# Patient Record
Sex: FEMALE | Race: BLACK OR AFRICAN AMERICAN | Employment: FULL TIME | URBAN - METROPOLITAN AREA
[De-identification: names, ages, dates, MRNs, and addresses within clinical notes are randomized per-mention and may not be internally consistent; named-entity substitution may affect disease eponyms.]

---

## 2017-01-03 ENCOUNTER — ED HISTORICAL/CONVERTED ENCOUNTER (OUTPATIENT)
Dept: OTHER | Age: 47
End: 2017-01-03

## 2017-03-16 ENCOUNTER — ED HISTORICAL/CONVERTED ENCOUNTER (OUTPATIENT)
Dept: OTHER | Age: 47
End: 2017-03-16

## 2017-09-02 ENCOUNTER — ED HISTORICAL/CONVERTED ENCOUNTER (OUTPATIENT)
Dept: OTHER | Age: 47
End: 2017-09-02

## 2020-04-02 ENCOUNTER — ED HISTORICAL/CONVERTED ENCOUNTER (OUTPATIENT)
Dept: OTHER | Age: 50
End: 2020-04-02

## 2021-03-08 ENCOUNTER — APPOINTMENT (OUTPATIENT)
Dept: CT IMAGING | Age: 51
End: 2021-03-08
Attending: EMERGENCY MEDICINE
Payer: COMMERCIAL

## 2021-03-08 ENCOUNTER — APPOINTMENT (OUTPATIENT)
Dept: GENERAL RADIOLOGY | Age: 51
End: 2021-03-08
Attending: STUDENT IN AN ORGANIZED HEALTH CARE EDUCATION/TRAINING PROGRAM
Payer: COMMERCIAL

## 2021-03-08 ENCOUNTER — HOSPITAL ENCOUNTER (OUTPATIENT)
Age: 51
Setting detail: OBSERVATION
LOS: 1 days | Discharge: HOME | End: 2021-03-10
Attending: EMERGENCY MEDICINE | Admitting: FAMILY MEDICINE
Payer: COMMERCIAL

## 2021-03-08 DIAGNOSIS — R07.9 ACUTE CHEST PAIN: Primary | ICD-10-CM

## 2021-03-08 LAB
BASOPHILS # BLD: 0 K/UL (ref 0–0.1)
BASOPHILS NFR BLD: 1 % (ref 0–1)
DIFFERENTIAL METHOD BLD: ABNORMAL
EOSINOPHIL # BLD: 0.1 K/UL (ref 0–0.4)
EOSINOPHIL NFR BLD: 2 % (ref 0–7)
ERYTHROCYTE [DISTWIDTH] IN BLOOD BY AUTOMATED COUNT: 12.9 % (ref 11.5–14.5)
HCT VFR BLD AUTO: 42.5 % (ref 35–47)
HGB BLD-MCNC: 13.7 G/DL (ref 11.5–16)
IMM GRANULOCYTES # BLD AUTO: 0 K/UL (ref 0–0.04)
IMM GRANULOCYTES NFR BLD AUTO: 0 % (ref 0–0.5)
LYMPHOCYTES # BLD: 4.8 K/UL (ref 0.8–3.5)
LYMPHOCYTES NFR BLD: 60 % (ref 12–49)
MCH RBC QN AUTO: 30.5 PG (ref 26–34)
MCHC RBC AUTO-ENTMCNC: 32.2 G/DL (ref 30–36.5)
MCV RBC AUTO: 94.7 FL (ref 80–99)
MONOCYTES # BLD: 0.6 K/UL (ref 0–1)
MONOCYTES NFR BLD: 7 % (ref 5–13)
NEUTS SEG # BLD: 2.4 K/UL (ref 1.8–8)
NEUTS SEG NFR BLD: 30 % (ref 32–75)
PLATELET # BLD AUTO: 288 K/UL (ref 150–400)
PMV BLD AUTO: 10 FL (ref 8.9–12.9)
RBC # BLD AUTO: 4.49 M/UL (ref 3.8–5.2)
WBC # BLD AUTO: 7.9 K/UL (ref 3.6–11)

## 2021-03-08 PROCEDURE — 83690 ASSAY OF LIPASE: CPT

## 2021-03-08 PROCEDURE — 36415 COLL VENOUS BLD VENIPUNCTURE: CPT

## 2021-03-08 PROCEDURE — 80053 COMPREHEN METABOLIC PANEL: CPT

## 2021-03-08 PROCEDURE — 83880 ASSAY OF NATRIURETIC PEPTIDE: CPT

## 2021-03-08 PROCEDURE — 85025 COMPLETE CBC W/AUTO DIFF WBC: CPT

## 2021-03-08 PROCEDURE — 84484 ASSAY OF TROPONIN QUANT: CPT

## 2021-03-08 PROCEDURE — 93005 ELECTROCARDIOGRAM TRACING: CPT

## 2021-03-08 PROCEDURE — 99284 EMERGENCY DEPT VISIT MOD MDM: CPT

## 2021-03-08 PROCEDURE — 71045 X-RAY EXAM CHEST 1 VIEW: CPT

## 2021-03-08 PROCEDURE — 83735 ASSAY OF MAGNESIUM: CPT

## 2021-03-08 NOTE — Clinical Note
Status[de-identified] INPATIENT [101]   Type of Bed: Telemetry [19]   Inpatient Hospitalization Certified Necessary for the Following Reasons: 9.  Other (further clarification in H&P documentation)   Admitting Diagnosis: Chest pain [[id number]]   Admitting Physician: Ania Kunz [[id number]]   Attending Physician: Ania Kunz [[id number]]   Estimated Length of Stay: 2 Midnights   Discharge Plan[de-identified] Home with Office Follow-up

## 2021-03-09 ENCOUNTER — APPOINTMENT (OUTPATIENT)
Dept: NON INVASIVE DIAGNOSTICS | Age: 51
End: 2021-03-09
Attending: FAMILY MEDICINE
Payer: COMMERCIAL

## 2021-03-09 ENCOUNTER — APPOINTMENT (OUTPATIENT)
Dept: CT IMAGING | Age: 51
End: 2021-03-09
Attending: EMERGENCY MEDICINE
Payer: COMMERCIAL

## 2021-03-09 PROBLEM — R07.9 CHEST PAIN: Status: ACTIVE | Noted: 2021-03-09

## 2021-03-09 LAB
ALBUMIN SERPL-MCNC: 4 G/DL (ref 3.5–5)
ALBUMIN/GLOB SERPL: 1 {RATIO} (ref 1.1–2.2)
ALP SERPL-CCNC: 78 U/L (ref 45–117)
ALT SERPL-CCNC: 40 U/L (ref 12–78)
ANION GAP SERPL CALC-SCNC: 5 MMOL/L (ref 5–15)
AST SERPL W P-5'-P-CCNC: 49 U/L (ref 15–37)
ATRIAL RATE: 57 BPM
ATRIAL RATE: 68 BPM
BILIRUB SERPL-MCNC: 0.3 MG/DL (ref 0.2–1)
BNP SERPL-MCNC: 35 PG/ML
BUN SERPL-MCNC: 15 MG/DL (ref 6–20)
BUN/CREAT SERPL: 13 (ref 12–20)
CA-I BLD-MCNC: 9 MG/DL (ref 8.5–10.1)
CALCULATED P AXIS, ECG09: 39 DEGREES
CALCULATED P AXIS, ECG09: 55 DEGREES
CALCULATED R AXIS, ECG10: 32 DEGREES
CALCULATED R AXIS, ECG10: 49 DEGREES
CALCULATED T AXIS, ECG11: 54 DEGREES
CALCULATED T AXIS, ECG11: 55 DEGREES
CHLORIDE SERPL-SCNC: 106 MMOL/L (ref 97–108)
CO2 SERPL-SCNC: 28 MMOL/L (ref 21–32)
CREAT SERPL-MCNC: 1.15 MG/DL (ref 0.55–1.02)
DIAGNOSIS, 93000: NORMAL
DIAGNOSIS, 93000: NORMAL
ECHO AV AREA PEAK VELOCITY: 2.42 CM2
ECHO AV AREA/BSA PEAK VELOCITY: 1.3 CM2/M2
ECHO AV PEAK GRADIENT: 5 MMHG
ECHO EST RA PRESSURE: 3 MMHG
ECHO LA AREA 4C: 8.46 CM2
ECHO LV E' SEPTAL VELOCITY: 10.3 CM/S
ECHO LV EDV A2C: 95.4 CM3
ECHO LV EJECTION FRACTION A2C: 63 %
ECHO LV EJECTION FRACTION BIPLANE: 63.5 % (ref 55–100)
ECHO LV ESV A2C: 23.4 CM3
ECHO LV INTERNAL DIMENSION DIASTOLIC: 4.57 CM (ref 3.9–5.3)
ECHO LV INTERNAL DIMENSION SYSTOLIC: 2.86 CM
ECHO LV IVSD: 1.12 CM (ref 0.6–0.9)
ECHO LV MASS 2D: 146.5 G (ref 67–162)
ECHO LV MASS INDEX 2D: 78 G/M2 (ref 43–95)
ECHO LV POSTERIOR WALL DIASTOLIC: 0.78 CM (ref 0.6–0.9)
ECHO LVOT DIAM: 1.8 CM
ECHO LVOT PEAK GRADIENT: 4 MMHG
ECHO MV A VELOCITY: 54.3 CM/S
ECHO MV AREA PHT: 1.83 CM2
ECHO MV E DECELERATION TIME (DT): 201 MS
ECHO MV E VELOCITY: 68.5 CM/S
ECHO MV E/A RATIO: 1.26
ECHO MV E/E' SEPTAL: 6.65
ECHO MV PRESSURE HALF TIME (PHT): 120 MS
ECHO PV PEAK INSTANTANEOUS GRADIENT SYSTOLIC: 3 MMHG
ECHO PV PEAK INSTANTANEOUS GRADIENT SYSTOLIC: 5 MMHG
ECHO PV REGURGITANT MAX VELOCITY: 104 CM/S
ECHO PV REGURGITANT MAX VELOCITY: 107 CM/S
ECHO PV REGURGITANT MAX VELOCITY: 109 CM/S
ECHO PV REGURGITANT MAX VELOCITY: 87.5 CM/S
ECHO PVEIN A DURATION: 85 MS
ECHO PVEIN A VELOCITY: 28.5 CM/S
ECHO RA AREA 4C: 10.7 CM2
ECHO RIGHT VENTRICULAR SYSTOLIC PRESSURE (RVSP): 23 MMHG
ECHO RV INTERNAL DIMENSION: 3 CM
ECHO TV MAX VELOCITY: 222 CM/S
ECHO TV REGURGITANT PEAK GRADIENT: 20 MMHG
GLOBULIN SER CALC-MCNC: 3.9 G/DL (ref 2–4)
GLUCOSE SERPL-MCNC: 99 MG/DL (ref 65–100)
LIPASE SERPL-CCNC: 132 U/L (ref 73–393)
MAGNESIUM SERPL-MCNC: 2 MG/DL (ref 1.6–2.4)
MV DEC SLOPE: 2290 MM/S2
MV DEC SLOPE: 2290 MM/S2
P-R INTERVAL, ECG05: 166 MS
P-R INTERVAL, ECG05: 168 MS
POTASSIUM SERPL-SCNC: 4.4 MMOL/L (ref 3.5–5.1)
PROT SERPL-MCNC: 7.9 G/DL (ref 6.4–8.2)
Q-T INTERVAL, ECG07: 394 MS
Q-T INTERVAL, ECG07: 430 MS
QRS DURATION, ECG06: 72 MS
QRS DURATION, ECG06: 82 MS
QTC CALCULATION (BEZET), ECG08: 418 MS
QTC CALCULATION (BEZET), ECG08: 418 MS
SODIUM SERPL-SCNC: 139 MMOL/L (ref 136–145)
TROPONIN I SERPL-MCNC: <0.05 NG/ML
TSH SERPL DL<=0.05 MIU/L-ACNC: 0.5 UIU/ML (ref 0.36–3.74)
VENTRICULAR RATE, ECG03: 57 BPM
VENTRICULAR RATE, ECG03: 68 BPM

## 2021-03-09 PROCEDURE — 71275 CT ANGIOGRAPHY CHEST: CPT

## 2021-03-09 PROCEDURE — 84484 ASSAY OF TROPONIN QUANT: CPT

## 2021-03-09 PROCEDURE — 36415 COLL VENOUS BLD VENIPUNCTURE: CPT

## 2021-03-09 PROCEDURE — 74011250637 HC RX REV CODE- 250/637: Performed by: EMERGENCY MEDICINE

## 2021-03-09 PROCEDURE — 74011250637 HC RX REV CODE- 250/637: Performed by: FAMILY MEDICINE

## 2021-03-09 PROCEDURE — 74011000636 HC RX REV CODE- 636: Performed by: EMERGENCY MEDICINE

## 2021-03-09 PROCEDURE — 99218 HC RM OBSERVATION: CPT

## 2021-03-09 PROCEDURE — 84443 ASSAY THYROID STIM HORMONE: CPT

## 2021-03-09 PROCEDURE — 74011250636 HC RX REV CODE- 250/636: Performed by: FAMILY MEDICINE

## 2021-03-09 PROCEDURE — 93005 ELECTROCARDIOGRAM TRACING: CPT

## 2021-03-09 PROCEDURE — 93306 TTE W/DOPPLER COMPLETE: CPT

## 2021-03-09 PROCEDURE — 96372 THER/PROPH/DIAG INJ SC/IM: CPT

## 2021-03-09 RX ORDER — SODIUM CHLORIDE 9 MG/ML
75 INJECTION, SOLUTION INTRAVENOUS CONTINUOUS
Status: DISCONTINUED | OUTPATIENT
Start: 2021-03-09 | End: 2021-03-10 | Stop reason: HOSPADM

## 2021-03-09 RX ORDER — ASPIRIN 325 MG
325 TABLET ORAL DAILY
Status: DISCONTINUED | OUTPATIENT
Start: 2021-03-09 | End: 2021-03-10 | Stop reason: HOSPADM

## 2021-03-09 RX ORDER — GUAIFENESIN 100 MG/5ML
324 LIQUID (ML) ORAL
Status: COMPLETED | OUTPATIENT
Start: 2021-03-09 | End: 2021-03-09

## 2021-03-09 RX ORDER — POLYETHYLENE GLYCOL 3350 17 G/17G
17 POWDER, FOR SOLUTION ORAL DAILY PRN
Status: DISCONTINUED | OUTPATIENT
Start: 2021-03-09 | End: 2021-03-10 | Stop reason: HOSPADM

## 2021-03-09 RX ORDER — ONDANSETRON 2 MG/ML
4 INJECTION INTRAMUSCULAR; INTRAVENOUS
Status: DISCONTINUED | OUTPATIENT
Start: 2021-03-09 | End: 2021-03-10 | Stop reason: HOSPADM

## 2021-03-09 RX ORDER — ACETAMINOPHEN 650 MG/1
650 SUPPOSITORY RECTAL
Status: DISCONTINUED | OUTPATIENT
Start: 2021-03-09 | End: 2021-03-10 | Stop reason: HOSPADM

## 2021-03-09 RX ORDER — ENOXAPARIN SODIUM 100 MG/ML
40 INJECTION SUBCUTANEOUS DAILY
Status: DISCONTINUED | OUTPATIENT
Start: 2021-03-09 | End: 2021-03-10 | Stop reason: HOSPADM

## 2021-03-09 RX ORDER — ATORVASTATIN CALCIUM 40 MG/1
40 TABLET, FILM COATED ORAL
Status: DISCONTINUED | OUTPATIENT
Start: 2021-03-09 | End: 2021-03-10 | Stop reason: HOSPADM

## 2021-03-09 RX ORDER — ONDANSETRON 4 MG/1
4 TABLET, ORALLY DISINTEGRATING ORAL
Status: DISCONTINUED | OUTPATIENT
Start: 2021-03-09 | End: 2021-03-10 | Stop reason: HOSPADM

## 2021-03-09 RX ORDER — ACETAMINOPHEN 325 MG/1
650 TABLET ORAL
Status: DISCONTINUED | OUTPATIENT
Start: 2021-03-09 | End: 2021-03-10 | Stop reason: HOSPADM

## 2021-03-09 RX ADMIN — ASPIRIN 325 MG ORAL TABLET 325 MG: 325 PILL ORAL at 12:32

## 2021-03-09 RX ADMIN — IOPAMIDOL 100 ML: 755 INJECTION, SOLUTION INTRAVENOUS at 00:40

## 2021-03-09 RX ADMIN — ENOXAPARIN SODIUM 40 MG: 40 INJECTION SUBCUTANEOUS at 12:32

## 2021-03-09 RX ADMIN — ASPIRIN 324 MG: 81 TABLET, CHEWABLE ORAL at 01:52

## 2021-03-09 NOTE — ED PROVIDER NOTES
EMERGENCY DEPARTMENT HISTORY AND PHYSICAL EXAM      Date: 3/8/2021  Patient Name: Paola Donald      History of Presenting Illness     Chief Complaint   Patient presents with    Chest Pain       History Provided By: Patient    HPI: Paola Donald, 45 y.o. female with a past medical history significant  presents to the ED with cc of sob still not chest pain chest pressure with mild shortness of breath that happened 2 or 3 times this afternoon. Patient does have history of chest pain in the past.  Last stress test was 8 years ago at [place]. Patient denies any nausea vomiting, diaphoresis. Patient admits to be infected with Covid back in January about 3 months ago. Chest pressure is 3/10 at this time. Does not radiate. No other complaints. Patient denies a smoking. Patient is a social drinker. There are no other complaints, changes, or physical findings at this time. PCP: Matthew, MD [place]        Past History     Past Medical History:  Past Medical History:   Diagnosis Date    Anxiety        Past Surgical History:  Past Surgical History:   Procedure Laterality Date    HX APPENDECTOMY      HX TUBAL LIGATION         Family History:  No family history on file. Social History:  Social History     Tobacco Use    Smoking status: Never Smoker    Smokeless tobacco: Never Used   Substance Use Topics    Alcohol use: Yes     Comment: occasional    Drug use: Not Currently       Allergies: Allergies   Allergen Reactions    Paxil [Paroxetine Hcl] Unable to Obtain         Review of Systems     Review of Systems   Constitutional: Negative. HENT: Negative. Eyes: Negative. Respiratory: Negative. Cardiovascular: Positive for chest pain. Gastrointestinal: Negative. Genitourinary: Negative. Musculoskeletal: Negative. Skin: Negative. Neurological: Negative. Psychiatric/Behavioral: Negative. All other systems reviewed and are negative.       Physical Exam     Physical Exam  Vitals signs and nursing note reviewed. Constitutional:       General: She is not in acute distress. Appearance: Normal appearance. She is well-developed and normal weight. She is not ill-appearing, toxic-appearing or diaphoretic. HENT:      Head: Normocephalic. Right Ear: External ear normal.      Left Ear: External ear normal.      Nose: Nose normal. No congestion or rhinorrhea. Mouth/Throat:      Pharynx: Oropharynx is clear. No oropharyngeal exudate or posterior oropharyngeal erythema. Eyes:      General: No scleral icterus. Right eye: No discharge. Left eye: No discharge. Extraocular Movements: Extraocular movements intact. Conjunctiva/sclera: Conjunctivae normal.      Pupils: Pupils are equal, round, and reactive to light. Neck:      Musculoskeletal: Normal range of motion and neck supple. No neck rigidity or muscular tenderness. Cardiovascular:      Rate and Rhythm: Normal rate and regular rhythm. Pulses: Normal pulses. Heart sounds: Normal heart sounds. No murmur. Pulmonary:      Effort: Pulmonary effort is normal. No respiratory distress. Breath sounds: Normal breath sounds. No stridor. No wheezing, rhonchi or rales. Chest:      Chest wall: No deformity or tenderness. Abdominal:      General: Abdomen is flat. Bowel sounds are normal. There is no distension. Palpations: Abdomen is soft. Tenderness: There is no abdominal tenderness. There is no right CVA tenderness, left CVA tenderness, guarding or rebound. Musculoskeletal: Normal range of motion. General: No swelling, tenderness, deformity or signs of injury. Right lower leg: No edema. Left lower leg: No edema. Skin:     General: Skin is warm. Capillary Refill: Capillary refill takes less than 2 seconds. Coloration: Skin is not jaundiced or pale. Findings: No bruising, erythema, lesion or rash.    Neurological:      General: No focal deficit present. Mental Status: She is alert and oriented to person, place, and time. Mental status is at baseline. Cranial Nerves: No cranial nerve deficit. Sensory: No sensory deficit. Motor: No weakness. Coordination: Coordination normal.   Psychiatric:         Mood and Affect: Mood normal.         Behavior: Behavior normal.         Thought Content: Thought content normal.         Judgment: Judgment normal.         Lab and Diagnostic Study Results     Labs -     Recent Results (from the past 12 hour(s))   CBC WITH AUTOMATED DIFF    Collection Time: 03/08/21 10:55 PM   Result Value Ref Range    WBC 7.9 3.6 - 11.0 K/uL    RBC 4.49 3.80 - 5.20 M/uL    HGB 13.7 11.5 - 16.0 g/dL    HCT 42.5 35.0 - 47.0 %    MCV 94.7 80.0 - 99.0 FL    MCH 30.5 26.0 - 34.0 PG    MCHC 32.2 30.0 - 36.5 g/dL    RDW 12.9 11.5 - 14.5 %    PLATELET 645 254 - 456 K/uL    MPV 10.0 8.9 - 12.9 FL    NEUTROPHILS 30 (L) 32 - 75 %    LYMPHOCYTES 60 (H) 12 - 49 %    MONOCYTES 7 5 - 13 %    EOSINOPHILS 2 0 - 7 %    BASOPHILS 1 0 - 1 %    IMMATURE GRANULOCYTES 0 0.0 - 0.5 %    ABS. NEUTROPHILS 2.4 1.8 - 8.0 K/UL    ABS. LYMPHOCYTES 4.8 (H) 0.8 - 3.5 K/UL    ABS. MONOCYTES 0.6 0.0 - 1.0 K/UL    ABS. EOSINOPHILS 0.1 0.0 - 0.4 K/UL    ABS. BASOPHILS 0.0 0.0 - 0.1 K/UL    ABS. IMM. GRANS. 0.0 0.00 - 0.04 K/UL    DF AUTOMATED     METABOLIC PANEL, COMPREHENSIVE    Collection Time: 03/08/21 10:55 PM   Result Value Ref Range    Sodium 139 136 - 145 mmol/L    Potassium 4.4 3.5 - 5.1 mmol/L    Chloride 106 97 - 108 mmol/L    CO2 28 21 - 32 mmol/L    Anion gap 5 5 - 15 mmol/L    Glucose 99 65 - 100 mg/dL    BUN 15 6 - 20 mg/dL    Creatinine 1.15 (H) 0.55 - 1.02 mg/dL    BUN/Creatinine ratio 13 12 - 20      GFR est AA >60 >60 ml/min/1.73m2    GFR est non-AA 50 (L) >60 ml/min/1.73m2    Calcium 9.0 8.5 - 10.1 mg/dL    Bilirubin, total 0.3 0.2 - 1.0 mg/dL    AST (SGOT) 49 (H) 15 - 37 U/L    ALT (SGPT) 40 12 - 78 U/L    Alk.  phosphatase 78 45 - 117 U/L    Protein, total 7.9 6.4 - 8.2 g/dL    Albumin 4.0 3.5 - 5.0 g/dL    Globulin 3.9 2.0 - 4.0 g/dL    A-G Ratio 1.0 (L) 1.1 - 2.2     TROPONIN I    Collection Time: 03/08/21 10:55 PM   Result Value Ref Range    Troponin-I, Qt. <0.05 <0.05 ng/mL   BNP    Collection Time: 03/08/21 10:55 PM   Result Value Ref Range    NT pro-BNP 35 <125 pg/mL   LIPASE    Collection Time: 03/08/21 10:55 PM   Result Value Ref Range    Lipase 132 73 - 393 U/L   MAGNESIUM    Collection Time: 03/08/21 10:55 PM   Result Value Ref Range    Magnesium 2.0 1.6 - 2.4 mg/dL       Radiologic Studies -   [unfilled]  CT Results  (Last 48 hours)               03/09/21 0039  CTA CHEST W OR W WO CONT Final result    Impression:      No pulmonary emboli. Mild right axillary adenopathy. Questionable small density in the right breast.   Recommend a follow-up mammogram for further evaluation, if clinically indicated. The results were called and verbally communicated to Dr. Abiel Ponce on 3/9/2021 at   1:15 AM.       Narrative:  CTA chest with intravenous contrast, 100 cc Isovue 370, 3-D MIP images       Dose reduction: All CT scans at this facility are performed using dose reduction   optimization techniques as appropriate to a performed exam including the   following: Automated exposure control, adjustments of the mA and/or kV according   to patient size, or use of iterative reconstruction technique. INDICATION: Chest pain, shortness of breath       FINDINGS:       No pulmonary emboli are identified. No aneurysm or dissection of thoracic aorta. Approximately 7 mm density in the right breast may be part of fibroglandular   tissue, although a lesion would be difficult to exclude. Mammography is more   sensitive for evaluation of the breasts. Slightly enlarged right axillary nodes,   largest about 16 x 10 mm. No mediastinal adenopathy. No pleural or pericardial   effusions. No airspace disease in the lungs.  Minimal atelectasis/scar lung bases. No pneumothorax. No acute fracture in the visualized bony structures. CXR Results  (Last 48 hours)               03/08/21 2300  XR CHEST PORT Final result    Impression:  No acute cardiopulmonary process. Narrative:  XR CHEST PORT       Comparison: Chest radiograph dated April 2, 2020       Findings: The lungs are adequately inflated without focal consolidation. Cardiac   silhouette is within normal limits for size, no evidence of cardiac   decompensation. The osseous structures are intact. Medical Decision Making and ED Course   - I am the first and primary provider for this patient AND AM THE PRIMARY PROVIDER OF RECORD. - I reviewed the vital signs, available nursing notes, past medical history, past surgical history, family history and social history. - Initial assessment performed. The patients presenting problems have been discussed, and the staff are in agreement with the care plan formulated and outlined with them. I have encouraged them to ask questions as they arise throughout their visit. Vital Signs-Reviewed the patient's vital signs. Patient Vitals for the past 12 hrs:   Temp Pulse Resp BP SpO2   03/08/21 2235 98.1 °F (36.7 °C) 86 18 (!) 141/85 100 %       EKG interpretation: (Preliminary): EKG was done at 10:44 PM.  Normal sinus rhythm. Rate of 68. Normal axis. No acute ST elevation. No STEMI. No old EKG available for comparison at this time. Records Reviewed: Nursing Notes    The patient presents with chest pain    ED Course:              Provider Notes (Medical Decision Making):     MDM  Number of Diagnoses or Management Options  Acute chest pain: new, needed workup  Diagnosis management comments: Patient diagnoses include chest pain, chest wall pain, pneumothorax, pneumonia, bronchitis, pulmonary fibrosis, PE, pancreatitis, gallbladder disease, coronary artery disease.        Amount and/or Complexity of Data Reviewed  Clinical lab tests: ordered and reviewed  Tests in the radiology section of CPT®: ordered  Tests in the medicine section of CPT®: reviewed and ordered  Discuss the patient with other providers: yes (Case discussed with admitting physician . Admit to his service.)  Independent visualization of images, tracings, or specimens: yes (EKG was done at 10:44 PM.  Normal sinus rhythm. Rate of 68. Normal axis. No acute ST elevation. No STEMI. No old EKG available for comparison at this time.    )    Risk of Complications, Morbidity, and/or Mortality  Presenting problems: high  Diagnostic procedures: high  Management options: high  General comments: Patient remained stable throughout her course of treatment. Labs were unremarkable. Troponin was negative. EKG did not show any acute changes. According to the patient and her father had heart attack at the age of 76s. The decision was made to admit patients. Case discussed with admitting doctor. Patient does not remember the name of her physician however she has stated her physician works at [place] and does not come to [facility]. Case also discussed with patient regarding admissions. She understood and agree with her management. Aspirin was given. Consultations:       Consultations:         Procedures and Critical Care               CRITICAL CARE NOTE :  10:50 PM  Amount of Critical Care Time: (minutes)    IMPENDING DETERIORATION -  ASSOCIATED RISK FACTORS -   MANAGEMENT-   INTERPRETATION -    INTERVENTIONS -   CASE REVIEW -   TREATMENT RESPONSE -  PERFORMED BY -     NOTES   :  I have spent critical care time involved in lab review, consultations with specialist, family decision- making, bedside attention and documentation. This time excludes time spent in any separate billed procedures. During this entire length of time I was immediately available to the patient .     David Shrestha, DO        Disposition     Disposition: Condition stable    Admitted        Diagnosis     Clinical Impression:   1. Acute chest pain        Attestations:    Madhuri Daily, DO    Please note that this dictation was completed with [employer], the computer voice recognition software. Quite often unanticipated grammatical, syntax, homophones, and other interpretive errors are inadvertently transcribed by the computer software. Please disregard these errors. Please excuse any errors that have escaped final proofreading. Thank you.

## 2021-03-09 NOTE — MED STUDENT NOTES
*ATTENTION:  This note has been created by a medical student for educational purposes only. Please do not refer to the content of this note for clinical decision-making, billing, or other purposes. Please see attending physicians note to obtain clinical information on this patient. * History and Physical 
 
NAME: Lisbeth Nieves :  1970 MRN:  [id number] Date/Time:  3/9/2021 8:51 AM 
 
Patient PCP: Watson Moreno MD 
______________________________________________________________________ Subjective: CHIEF COMPLAINT: chest pressure and shortness of breath HISTORY OF PRESENT ILLNESS:    
 
Patient is a 45y.o. year old female with past history of anxiety presented to the ED on 3/8 for chest pressure and shortness of breath. She states that the chest pressure had been intermittent throughout the weekend, but more pronounced at night. She states that she would wake up feeling the pressure and felt like she could not catch her breath. She denies any history of having this pain before, any history of heart problems, or sleep apnea. She also denies nausea, vomiting, and diaphoresis during these events. During this exam she states that she is not having any chest pressure or shortness of breath. She did not have any chest pressure or shortness of breath overnight either. Troponin < 0.05 BNP 35 CXR negative CTA chest showed No pulmonary emboli. Mild right axillary adenopathy. Questionable small density in the right breast. 
Recommend a follow-up mammogram for further evaluation, if clinically indicated. Past Medical History:  
Diagnosis Date  Anxiety Past Surgical History:  
Procedure Laterality Date  HX APPENDECTOMY  HX TUBAL LIGATION Social History Tobacco Use  Smoking status: Never Smoker  Smokeless tobacco: Never Used Substance Use Topics  Alcohol use: Yes Comment: occasional  
  
 
Family History Family history unknown: Yes Allergies Allergen Reactions  Paxil [Paroxetine Hcl] Unable to [facility] [place] Prior to Admission medications Not on File No current facility-administered medications for this encounter. LAB DATA REVIEWED:   
Recent Results (from the past 24 hour(s)) CBC WITH AUTOMATED DIFF Collection Time: 03/08/21 10:55 PM  
Result Value Ref Range WBC 7.9 3.6 - 11.0 K/uL  
 RBC 4.49 3.80 - 5.20 M/uL  
 HGB 13.7 11.5 - 16.0 g/dL HCT 42.5 35.0 - 47.0 % MCV 94.7 80.0 - 99.0 FL  
 MCH 30.5 26.0 - 34.0 PG  
 MCHC 32.2 30.0 - 36.5 g/dL  
 RDW 12.9 11.5 - 14.5 % PLATELET 917 603 - 777 K/uL MPV 10.0 8.9 - 12.9 FL  
 NEUTROPHILS 30 (L) 32 - 75 % LYMPHOCYTES 60 (H) 12 - 49 % MONOCYTES 7 5 - 13 % EOSINOPHILS 2 0 - 7 % BASOPHILS 1 0 - 1 % IMMATURE GRANULOCYTES 0 0.0 - 0.5 % ABS. NEUTROPHILS 2.4 1.8 - 8.0 K/UL  
 ABS. LYMPHOCYTES 4.8 (H) 0.8 - 3.5 K/UL  
 ABS. MONOCYTES 0.6 0.0 - 1.0 K/UL  
 ABS. EOSINOPHILS 0.1 0.0 - 0.4 K/UL  
 ABS. BASOPHILS 0.0 0.0 - 0.1 K/UL  
 ABS. IMM. GRANS. 0.0 0.00 - 0.04 K/UL  
 DF AUTOMATED METABOLIC PANEL, COMPREHENSIVE Collection Time: 03/08/21 10:55 PM  
Result Value Ref Range Sodium 139 136 - 145 mmol/L Potassium 4.4 3.5 - 5.1 mmol/L Chloride 106 97 - 108 mmol/L  
 CO2 28 21 - 32 mmol/L Anion gap 5 5 - 15 mmol/L Glucose 99 65 - 100 mg/dL BUN 15 6 - 20 mg/dL Creatinine 1.15 (H) 0.55 - 1.02 mg/dL BUN/Creatinine ratio 13 12 - 20 GFR est AA >60 >60 ml/min/1.73m2 GFR est non-AA 50 (L) >60 ml/min/1.73m2 Calcium 9.0 8.5 - 10.1 mg/dL Bilirubin, total 0.3 0.2 - 1.0 mg/dL AST (SGOT) 49 (H) 15 - 37 U/L  
 ALT (SGPT) 40 12 - 78 U/L Alk. phosphatase 78 45 - 117 U/L Protein, total 7.9 6.4 - 8.2 g/dL Albumin 4.0 3.5 - 5.0 g/dL Globulin 3.9 2.0 - 4.0 g/dL A-G Ratio 1.0 (L) 1.1 - 2.2    
TROPONIN I Collection Time: 03/08/21 10:55 PM  
Result Value Ref Range  Troponin-I, Qt. <0.05 <0.05 ng/mL BNP Collection Time: 03/08/21 10:55 PM  
Result Value Ref Range NT pro-BNP 35 <125 pg/mL LIPASE Collection Time: 03/08/21 10:55 PM  
Result Value Ref Range Lipase 132 73 - 393 U/L MAGNESIUM Collection Time: 03/08/21 10:55 PM  
Result Value Ref Range Magnesium 2.0 1.6 - 2.4 mg/dL XR Results (most recent): 
Results from [facility] Encounter encounter on 03/08/21 XR CHEST PORT Narrative XR CHEST PORT Comparison: Chest radiograph dated April 2, 2020 Findings: The lungs are adequately inflated without focal consolidation. Cardiac 
silhouette is within normal limits for size, no evidence of cardiac 
decompensation. The osseous structures are intact. Impression No acute cardiopulmonary process. CTA CHEST W OR W WO CONT Final Result No pulmonary emboli. Mild right axillary adenopathy. Questionable small density in the right breast.  
Recommend a follow-up mammogram for further evaluation, if clinically indicated. The results were called and verbally communicated to Dr. Nakul Harrell on 3/9/2021 at  
1:15 AM. XR CHEST PORT Final Result No acute cardiopulmonary process. Review of Systems: 
Constitutional: Negative for chills and fever. HENT: Negative. Eyes: Negative. Respiratory: Negative. Cardiovascular: Negative. Gastrointestinal: Negative for abdominal pain and nausea. Skin: Negative. Neurological: Negative. Objective: VITALS:   
Visit Vitals /65 (BP 1 Location: Left upper arm, BP Patient Position: At rest) Pulse 62 Temp 98.6 °F (37 °C) Resp 20 Ht 5' 5\" (1.651 m) Wt 80.4 kg (177 lb 4 oz) SpO2 98% Breastfeeding No  
BMI 29.50 kg/m² Physical Exam:  
Constitutional: pt is oriented to person, place, and time. HENT:  
Head: Normocephalic and atraumatic. Eyes: Pupils are equal, round, and reactive to light.  EOM are normal.  
Cardiovascular: Normal rate, regular rhythm and normal heart sounds. Pulmonary/Chest: Breath sounds normal. No wheezes. No rales. Exhibits no tenderness. Abdominal: Soft. Bowel sounds are normal. There is no abdominal tenderness. There is no rebound and no guarding. Musculoskeletal: Normal range of motion. Neurological: pt is alert and oriented to person, place, and time. Alert. Normal strength. No cranial nerve deficit or sensory deficit. Displays a negative Romberg sign. ASSESSMENT: 
Chest pain Anxiety PLAN: 
 
Continue cardiac monitoring Consult cardiology  
________________________________________________________________________ Signed: Aixa Treadwell

## 2021-03-09 NOTE — CONSULTS
[facility] CARDIOLOGY  CARDIOLOGY CONSULTATION    REASON FOR CONSULT: Chest pain    REQUESTING PROVIDER: Dr. Sonja Meraz:  Chest pain    HISTORY OF PRESENT ILLNESS:  Lucian Dejesus is a 45y.o. year-old female with no significant past medical history  who was evaluated today due to chest pain. She presented to the ED last night with worsening chest pressure. The chest pressure started Saturday   3/6 and was off and on. She reports no specific aggravating or alleviating factors. The pressure would be relieved on its on. It would occur at rest and with walking. Last night there was associated shortness of breath and jaw pain which prompted her to go to the ED. She denies any nausea, vomiting, palpitations, dizziness, or syncope. She denies any previous heart attack or stroke. She is a nonsmoker. Exercises daily and is asymptomatic with exercise. She does report recently getting alerts from her apple watch for low HR. Normally her HR is in the 62s and recently has been in the 45s. EKG on admission showed NSR with no acute ST changes. Troponin was negative. Records from hospital admission course thus far reviewed. Telemetry reviewed. No events overnight. SB 46-55. Y[facility] [place] INPATIENT MEDICATIONS:  Home medications reviewed.     Current Facility-Administered Medications:     0.9% sodium chloride infusion, 75 mL/hr, IntraVENous, CONTINUOUS, Alyx Verdin MD    acetaminophen (TYLENOL) tablet 650 mg, 650 mg, Oral, Q6H PRN **OR** acetaminophen (TYLENOL) suppository 650 mg, 650 mg, Rectal, Q6H PRN, Alyx Verdin MD    polyethylene glycol (MIRALAX) packet 17 g, 17 g, Oral, DAILY PRN, Alyx Verdin MD    ondansetron (ZOFRAN ODT) tablet 4 mg, 4 mg, Oral, Q8H PRN **OR** ondansetron (ZOFRAN) injection 4 mg, 4 mg, IntraVENous, Q6H PRN, Faina Verdin MD    enoxaparin (LOVENOX) injection 40 mg, 40 mg, SubCUTAneous, DAILY, Faina Verdin MD    atorvastatin (LIPITOR) tablet 40 mg, 40 mg, Oral, QHS, Faina Verdin MD    aspirin tablet 325 mg, 325 mg, Oral, DAILY, Deepthi Verdin MD     ALLERGIES:  Allergies reviewed with the patient,  Allergies   Allergen Reactions    Paxil [Paroxetine Hcl] Unable to Obtain    . FAMILY HISTORY:  Family history reviewed. Stroke and heart attack      SOCIAL HISTORY:  Notable for no  tobacco use, no heavy alcohol or illicit drug use. REVIEW OF SYSTEMS:  Complete review of systems performed, pertinents noted above, all other systems are negative. PHYSICAL EXAMINATION:  Vital sign assessment reveal a blood pressure of  109/65  and pulse rate of 52. Cardiovascular exam has a heart with a bradycardic rate and regular rhythm, normal S1 and S2. No murmur present. There are no rubs or gallops. Good peripheral pulses. No jugular venous distension. Respiratory exam reveals clear lung fields, no rales or rhonchi. Gastrointestinal exam has soft, nontender abdomen with normal bowel sounds. Lymphatic exam reveals no edema and no varicosities. No notable skin changes. Neurologic exam is nonfocal.        Recent labs results and imaging reviewed.   Notable findings include   Lab Results   Component Value Date/Time    WBC 7.9 03/08/2021 10:55 PM    HGB 13.7 03/08/2021 10:55 PM    HCT 42.5 03/08/2021 10:55 PM    PLATELET 639 93/54/1628 10:55 PM    MCV 94.7 03/08/2021 10:55 PM     No results found for: CHOL, CHOLPOCT, HDL, LDL, LDLC, LDLCPOC, LDLCEXT, TRIGL, TGLPOCT, CHHD, CHHDX  No results found for: TSH, TSH2, TSH3, TSHP, TSHELE, TSHEXT, TT3, T3U, T3UP, FRT3, FT3, FT4, FT4P, T4, T4P, FT4T, TT7, TSHEXT   Lab Results   Component Value Date/Time    Troponin-I, Qt. <0.05 03/08/2021 10:55 PM      Lab Results   Component Value Date/Time    NT pro-BNP 35 03/08/2021 10:55 PM      Lab Results   Component Value Date/Time    Sodium 139 03/08/2021 10:55 PM    Potassium 4.4 03/08/2021 10:55 PM    Chloride 106 03/08/2021 10:55 PM    CO2 28 03/08/2021 10:55 PM Anion gap 5 03/08/2021 10:55 PM    Glucose 99 03/08/2021 10:55 PM    BUN 15 03/08/2021 10:55 PM    Creatinine 1.15 (H) 03/08/2021 10:55 PM    BUN/Creatinine ratio 13 03/08/2021 10:55 PM    GFR est AA >60 03/08/2021 10:55 PM    GFR est non-AA 50 (L) 03/08/2021 10:55 PM    Calcium 9.0 03/08/2021 10:55 PM    Bilirubin, total 0.3 03/08/2021 10:55 PM    ALT (SGPT) 40 03/08/2021 10:55 PM    Alk. phosphatase 78 03/08/2021 10:55 PM    Protein, total 7.9 03/08/2021 10:55 PM    Albumin 4.0 03/08/2021 10:55 PM    Globulin 3.9 03/08/2021 10:55 PM    A-G Ratio 1.0 (L) 03/08/2021 10:55 PM     .       Discussed case with Dr. Kandy Meckel and our impression and recommendations are as follows:  1. Chest pain: Troponin is negative x1, need to trend. Obtain another trop STAT, x2  EKG on admission does not show any acute ST changes. Will get another EKG as she is more bradycardic this AM. Will get nuclear stress test to evaluate for any ischemia. TTE pending. Continue telemetry monitoring. Please continue aspirin. NO BB indicated now given low HR. Will check lipids in the AM.   2. Bradycardia: HR 40-50s. This is new per the patient. Will assess repeat EKG for any AV node block. Check TSH. Thank you for involving us in the care of this patient. Please do not hesitate to call me or Dr. Kandy Meckel if additional questions arise.

## 2021-03-09 NOTE — H&P
History and Physical    NAME: Everardo Keene   :  1970   MRN:  [id number]     Date/Time:  3/9/2021 8:51 AM    Patient PCP: Bettina Castro MD  ______________________________________________________________________             Subjective:     CHIEF COMPLAINT: chest pressure and shortness of breath        HISTORY OF PRESENT ILLNESS:       Patient is a 45y.o. year old female with past history of anxiety presented to the ED on 3/8 for chest pressure and shortness of breath. She states that the chest pressure had been intermittent throughout the weekend, but more pronounced at night. She states that she would wake up feeling the pressure and felt like she could not catch her breath. She denies any history of having this pain before, any history of heart problems, or sleep apnea. She also denies nausea, vomiting, and diaphoresis during these events. During this exam she states that she is not having any chest pressure or shortness of breath. She did not have any chest pressure or shortness of breath overnight either. Troponin < 0.05  BNP 35    CXR negative    CTA chest showed  No pulmonary emboli. Mild right axillary adenopathy. Questionable small density in the right breast.  Recommend a follow-up mammogram for further evaluation, if clinically indicated. Past Medical History:   Diagnosis Date    Anxiety         Past Surgical History:   Procedure Laterality Date    HX APPENDECTOMY      HX TUBAL LIGATION         Social History     Tobacco Use    Smoking status: Never Smoker    Smokeless tobacco: Never Used   Substance Use Topics    Alcohol use: Yes     Comment: occasional        Family History   Family history unknown:  Yes       Allergies   Allergen Reactions    Paxil [Paroxetine Hcl] Unable to Obtain        Prior to Admission medications    Not on File         Current Facility-Administered Medications:     0.9% sodium chloride infusion, 75 mL/hr, IntraVENous, CONTINUOUS, Marlena Verdin MD  [facility] acetaminophen (TYLENOL) tablet 650 mg, 650 mg, Oral, Q6H PRN **OR** acetaminophen (TYLENOL) suppository 650 mg, 650 mg, Rectal, Q6H PRN, Micheline Verdin MD    polyethylene glycol (MIRALAX) packet 17 g, 17 g, Oral, DAILY PRN, Micheline Verdin MD    ondansetron (ZOFRAN ODT) tablet 4 mg, 4 mg, Oral, Q8H PRN **OR** ondansetron (ZOFRAN) injection 4 mg, 4 mg, IntraVENous, Q6H PRN, Faina Verdin MD    enoxaparin (LOVENOX) injection 40 mg, 40 mg, SubCUTAneous, DAILY, Micheline Verdin MD    atorvastatin (LIPITOR) tablet 40 mg, 40 mg, Oral, QHS, Faina Verdin MD    aspirin tablet 325 mg, 325 mg, Oral, DAILY, Faina Verdin MD    LAB DATA REVIEWED:    Recent Results (from the past 24 hour(s))   CBC WITH AUTOMATED DIFF    Collection Time: 03/08/21 10:55 PM   Result Value Ref Range    WBC 7.9 3.6 - 11.0 K/uL    RBC 4.49 3.80 - 5.20 M/uL    HGB 13.7 11.5 - 16.0 g/dL    HCT 42.5 35.0 - 47.0 %    MCV 94.7 80.0 - 99.0 FL    MCH 30.5 26.0 - 34.0 PG    MCHC 32.2 30.0 - 36.5 g/dL    RDW 12.9 11.5 - 14.5 %    PLATELET 820 908 - 736 K/uL    MPV 10.0 8.9 - 12.9 FL    NEUTROPHILS 30 (L) 32 - 75 %    LYMPHOCYTES 60 (H) 12 - 49 %    MONOCYTES 7 5 - 13 %    EOSINOPHILS 2 0 - 7 %    BASOPHILS 1 0 - 1 %    IMMATURE GRANULOCYTES 0 0.0 - 0.5 %    ABS. NEUTROPHILS 2.4 1.8 - 8.0 K/UL    ABS. LYMPHOCYTES 4.8 (H) 0.8 - 3.5 K/UL    ABS. MONOCYTES 0.6 0.0 - 1.0 K/UL    ABS. EOSINOPHILS 0.1 0.0 - 0.4 K/UL    ABS. BASOPHILS 0.0 0.0 - 0.1 K/UL    ABS. IMM.  GRANS. 0.0 0.00 - 0.04 K/UL    DF AUTOMATED     METABOLIC PANEL, COMPREHENSIVE    Collection Time: 03/08/21 10:55 PM   Result Value Ref Range    Sodium 139 136 - 145 mmol/L    Potassium 4.4 3.5 - 5.1 mmol/L    Chloride 106 97 - 108 mmol/L    CO2 28 21 - 32 mmol/L    Anion gap 5 5 - 15 mmol/L    Glucose 99 65 - 100 mg/dL    BUN 15 6 - 20 mg/dL    Creatinine 1.15 (H) 0.55 - 1.02 mg/dL    BUN/Creatinine ratio 13 12 - 20      GFR est AA >60 >60 ml/min/1.73m2    GFR est non-AA 50 (L) >60 ml/min/1.73m2    Calcium 9.0 8.5 - 10.1 mg/dL    Bilirubin, total 0.3 0.2 - 1.0 mg/dL    AST (SGOT) 49 (H) 15 - 37 U/L    ALT (SGPT) 40 12 - 78 U/L    Alk. phosphatase 78 45 - 117 U/L    Protein, total 7.9 6.4 - 8.2 g/dL    Albumin 4.0 3.5 - 5.0 g/dL    Globulin 3.9 2.0 - 4.0 g/dL    A-G Ratio 1.0 (L) 1.1 - 2.2     TROPONIN I    Collection Time: 03/08/21 10:55 PM   Result Value Ref Range    Troponin-I, Qt. <0.05 <0.05 ng/mL   BNP    Collection Time: 03/08/21 10:55 PM   Result Value Ref Range    NT pro-BNP 35 <125 pg/mL   LIPASE    Collection Time: 03/08/21 10:55 PM   Result Value Ref Range    Lipase 132 73 - 393 U/L   MAGNESIUM    Collection Time: 03/08/21 10:55 PM   Result Value Ref Range    Magnesium 2.0 1.6 - 2.4 mg/dL       XR Results (most recent):  Results from Hospital Encounter encounter on 03/08/21   XR CHEST PORT    Narrative XR CHEST PORT    Comparison: Chest radiograph dated April 2, 2020    Findings: The lungs are adequately inflated without focal consolidation. Cardiac  silhouette is within normal limits for size, no evidence of cardiac  decompensation. The osseous structures are intact. Impression No acute cardiopulmonary process. CTA CHEST W OR W WO CONT   Final Result      No pulmonary emboli. Mild right axillary adenopathy. Questionable small density in the right breast.   Recommend a follow-up mammogram for further evaluation, if clinically indicated. The results were called and verbally communicated to Dr. Ar Quinones on 3/9/2021 at   1:15 AM.      XR CHEST PORT   Final Result   No acute cardiopulmonary process. Review of Systems:  Constitutional: Negative for chills and fever. HENT: Negative. Eyes: Negative. Respiratory: Negative. Cardiovascular: Negative. Gastrointestinal: Negative for abdominal pain and nausea. Skin: Negative. Neurological: Negative.       Objective:   VITALS:    Visit Vitals  /65 (BP 1 Location: Left upper arm, BP Patient Position: At rest)   Pulse 62   Temp 98.6 °F (37 °C)   Resp 20   Ht 5' 5\" (1.651 m)   Wt 80.4 kg (177 lb 4 oz)   SpO2 98%   Breastfeeding No   BMI 29.50 kg/m²       Physical Exam:   Constitutional: pt is oriented to person, place, and time. HENT:   Head: Normocephalic and atraumatic. Eyes: Pupils are equal, round, and reactive to light. EOM are normal.   Cardiovascular: Normal rate, regular rhythm and normal heart sounds. Pulmonary/Chest: Breath sounds normal. No wheezes. No rales. Exhibits no tenderness. Abdominal: Soft. Bowel sounds are normal. There is no abdominal tenderness. There is no rebound and no guarding. Musculoskeletal: Normal range of motion. Neurological: pt is alert and oriented to person, place, and time. Alert. Normal strength. No cranial nerve deficit or sensory deficit. Displays a negative Romberg sign.         ASSESSMENT:  Chest pain  Anxiety    PLAN:    Continue cardiac monitoring   Consult cardiology     Current Facility-Administered Medications:     0.9% sodium chloride infusion, 75 mL/hr, IntraVENous, CONTINUOUS, Stevo Verdin MD    acetaminophen (TYLENOL) tablet 650 mg, 650 mg, Oral, Q6H PRN **OR** acetaminophen (TYLENOL) suppository 650 mg, 650 mg, Rectal, Q6H PRN, Stevo Verdin MD    polyethylene glycol (MIRALAX) packet 17 g, 17 g, Oral, DAILY PRN, Stevo Verdin MD    ondansetron (ZOFRAN ODT) tablet 4 mg, 4 mg, Oral, Q8H PRN **OR** ondansetron (ZOFRAN) injection 4 mg, 4 mg, IntraVENous, Q6H PRN, Faina Verdin MD    enoxaparin (LOVENOX) injection 40 mg, 40 mg, SubCUTAneous, DAILY, Faina Verdin MD    atorvastatin (LIPITOR) tablet 40 mg, 40 mg, Oral, QHS, Faina Verdin MD    aspirin tablet 325 mg, 325 mg, Oral, DAILY, Stevo Verdin MD    ________________________________________________________________________    Signed: Fernanda Jo MD

## 2021-03-10 ENCOUNTER — APPOINTMENT (OUTPATIENT)
Dept: NUCLEAR MEDICINE | Age: 51
End: 2021-03-10
Attending: INTERNAL MEDICINE
Payer: COMMERCIAL

## 2021-03-10 ENCOUNTER — APPOINTMENT (OUTPATIENT)
Dept: NON INVASIVE DIAGNOSTICS | Age: 51
End: 2021-03-10
Attending: INTERNAL MEDICINE
Payer: COMMERCIAL

## 2021-03-10 VITALS
SYSTOLIC BLOOD PRESSURE: 105 MMHG | DIASTOLIC BLOOD PRESSURE: 68 MMHG | WEIGHT: 177 LBS | RESPIRATION RATE: 20 BRPM | OXYGEN SATURATION: 100 % | TEMPERATURE: 98 F | HEIGHT: 65 IN | HEART RATE: 57 BPM | BODY MASS INDEX: 29.49 KG/M2

## 2021-03-10 LAB
ALBUMIN SERPL-MCNC: 3.6 G/DL (ref 3.5–5)
ALBUMIN/GLOB SERPL: 1 {RATIO} (ref 1.1–2.2)
ALP SERPL-CCNC: 72 U/L (ref 45–117)
ALT SERPL-CCNC: 29 U/L (ref 12–78)
ANION GAP SERPL CALC-SCNC: 5 MMOL/L (ref 5–15)
AST SERPL W P-5'-P-CCNC: 18 U/L (ref 15–37)
BASOPHILS # BLD: 0 K/UL (ref 0–0.1)
BASOPHILS NFR BLD: 1 % (ref 0–1)
BILIRUB SERPL-MCNC: 0.3 MG/DL (ref 0.2–1)
BUN SERPL-MCNC: 12 MG/DL (ref 6–20)
BUN/CREAT SERPL: 13 (ref 12–20)
CA-I BLD-MCNC: 9.4 MG/DL (ref 8.5–10.1)
CHLORIDE SERPL-SCNC: 108 MMOL/L (ref 97–108)
CO2 SERPL-SCNC: 29 MMOL/L (ref 21–32)
CREAT SERPL-MCNC: 0.91 MG/DL (ref 0.55–1.02)
DIFFERENTIAL METHOD BLD: ABNORMAL
EOSINOPHIL # BLD: 0.1 K/UL (ref 0–0.4)
EOSINOPHIL NFR BLD: 2 % (ref 0–7)
ERYTHROCYTE [DISTWIDTH] IN BLOOD BY AUTOMATED COUNT: 12.9 % (ref 11.5–14.5)
GLOBULIN SER CALC-MCNC: 3.7 G/DL (ref 2–4)
GLUCOSE SERPL-MCNC: 100 MG/DL (ref 65–100)
HCT VFR BLD AUTO: 40.8 % (ref 35–47)
HGB BLD-MCNC: 12.8 G/DL (ref 11.5–16)
IMM GRANULOCYTES # BLD AUTO: 0 K/UL (ref 0–0.04)
IMM GRANULOCYTES NFR BLD AUTO: 0 % (ref 0–0.5)
LYMPHOCYTES # BLD: 2.8 K/UL (ref 0.8–3.5)
LYMPHOCYTES NFR BLD: 54 % (ref 12–49)
MCH RBC QN AUTO: 29.9 PG (ref 26–34)
MCHC RBC AUTO-ENTMCNC: 31.4 G/DL (ref 30–36.5)
MCV RBC AUTO: 95.3 FL (ref 80–99)
MONOCYTES # BLD: 0.4 K/UL (ref 0–1)
MONOCYTES NFR BLD: 8 % (ref 5–13)
NEUTS SEG # BLD: 1.8 K/UL (ref 1.8–8)
NEUTS SEG NFR BLD: 35 % (ref 32–75)
PLATELET # BLD AUTO: 276 K/UL (ref 150–400)
PMV BLD AUTO: 10.6 FL (ref 8.9–12.9)
POTASSIUM SERPL-SCNC: 4.8 MMOL/L (ref 3.5–5.1)
PROT SERPL-MCNC: 7.3 G/DL (ref 6.4–8.2)
RBC # BLD AUTO: 4.28 M/UL (ref 3.8–5.2)
SODIUM SERPL-SCNC: 142 MMOL/L (ref 136–145)
STRESS ANGINA INDEX: 0
STRESS BASELINE DIAS BP: 102 MMHG
STRESS BASELINE HR: 75 BPM
STRESS BASELINE SYS BP: 151 MMHG
STRESS ESTIMATED WORKLOAD: 9 METS
STRESS EXERCISE DUR MIN: 7 MIN:SEC
STRESS PERCENT HR ACHIEVED: 85 %
STRESS POST PEAK HR: 143 BPM
STRESS SR DUKE TREADMILL SCORE: 7
STRESS ST DEPRESSION: 0 MM
STRESS ST ELEVATION: 0 MM
STRESS STAGE 1 BP: NORMAL MMHG
STRESS STAGE 1 DURATION: NORMAL MIN:SEC
STRESS STAGE 1 HR: 142 BPM
STRESS STAGE 2 DURATION: NORMAL MIN:SEC
STRESS STAGE 2 HR: 162 BPM
STRESS STAGE 3 BP: NORMAL MMHG
STRESS STAGE 3 DURATION: NORMAL MIN:SEC
STRESS STAGE 3 HR: 169 BPM
STRESS STAGE RECOVERY 1 BP: NORMAL MMHG
STRESS STAGE RECOVERY 1 DURATION: NORMAL MIN:SEC
STRESS STAGE RECOVERY 1 HR: 153 BPM
STRESS STAGE RECOVERY 2 DURATION: NORMAL MIN:SEC
STRESS STAGE RECOVERY 2 HR: 126 BPM
STRESS STAGE RECOVERY 3 BP: NORMAL MMHG
STRESS STAGE RECOVERY 3 DURATION: NORMAL MIN:SEC
STRESS STAGE RECOVERY 3 HR: 115 BPM
STRESS STAGE RECOVERY 4 COMMENTS: NORMAL
STRESS STAGE RECOVERY 4 DURATION: NORMAL MIN:SEC
STRESS STAGE RECOVERY 4 HR: 99 BPM
STRESS TARGET HR: 168 BPM
WBC # BLD AUTO: 5.2 K/UL (ref 3.6–11)

## 2021-03-10 PROCEDURE — A9500 TC99M SESTAMIBI: HCPCS

## 2021-03-10 PROCEDURE — 80061 LIPID PANEL: CPT

## 2021-03-10 PROCEDURE — 74011250637 HC RX REV CODE- 250/637: Performed by: FAMILY MEDICINE

## 2021-03-10 PROCEDURE — 36415 COLL VENOUS BLD VENIPUNCTURE: CPT

## 2021-03-10 PROCEDURE — 99218 HC RM OBSERVATION: CPT

## 2021-03-10 PROCEDURE — 85025 COMPLETE CBC W/AUTO DIFF WBC: CPT

## 2021-03-10 PROCEDURE — 80053 COMPREHEN METABOLIC PANEL: CPT

## 2021-03-10 RX ADMIN — ASPIRIN 325 MG ORAL TABLET 325 MG: 325 PILL ORAL at 11:07

## 2021-03-10 NOTE — PROGRESS NOTES
Anuel Finney was a patient at [facility] from 03/08/2021-03/10/2021. She is able to return to work on 03/13/2021. Iv and telemetry removed, discharge instructions given and understood and patient discharged home with family.

## 2021-03-10 NOTE — PROGRESS NOTES
CARDIOLOGY PROGRESS NOTE - NP    Patient seen and examined. This is a patient who is followed for chest pain. Patient seen during nuclear stress test. She has not had any further chest pain/pressure, dyspnea, or dizziness. She was able to complete three stages on treadmill without symptoms. No other complaints reported. Telemetry reviewed, there were no events noted in the past 24 hours. Sinus calin to sinus rhythm 45-60    Pertinent review of systems items noted above, all other systems are negative. Current medications reviewed. Physical Examination  Vital signs are stable. Blood pressure 120/80, Pulse 57  No apparent distress. Heart is regular, rate and rhythm. Normal S1, S2, no murmurs are appreciated. Lungs are clear bilaterally. Abdomen is soft, nontender, normal bowel sounds. Extremities have no edema. Labs reviewed:   Crt 0.91    Case discussed with Dr. Denise Richter  and our impression and recommendations are as follows:  1. Chest pain: Troponin is negative x2. EKG on admission does not show any acute ST changes. Echocardiogram shows a preserve EF and no WMA. NST showed no concerns for ischemia. Please continue aspirin. Resting HR too low for adding BB. Lipid panel is still pending, can address at follow up. 2. Bradycardia: HR 40-50s. This is new per the patient. EKG an telemetry shows sinus, no AV blocks noted. On treadmill for NST, HR increased showing chronotropic competence. TSH normal      Please do not hesitate to call me or Dr. Denise Richter  if additional questions arise.

## 2021-03-10 NOTE — PROGRESS NOTES
*ATTENTION:  This note has been created by a medical student for educational purposes only. Please do not refer to the content of this note for clinical decision-making, billing, or other purposes. Please see attending physicians note to obtain clinical information on this patient. *     General Daily Progress Note          Patient Name:   Aminah Cedeno       YOB: 1970       Age:  45 y.o. Admit Date: 3/8/2021      Subjective:     Patient is a 44 yo female with previous history of anxiety who was admitted for chest pressure and shortness of breath. Patient seen in her room this morning sitting up in bed. States that she feels fine and denies shortness of breath, chest pain, leg swelling, or diaphoresis.      /80  P57    Troponins are negative     Echo showed EF 65% with normal cavity size, systolic, and diastolic dysfunction    Nuclear stress test scheduled for today        Objective:     Visit Vitals  /80 (BP 1 Location: Left upper arm, BP Patient Position: At rest;Supine)   Pulse (!) 57   Temp 97.9 °F (36.6 °C)   Resp 20   Ht 5' 5\" (1.651 m)   Wt 80.4 kg (177 lb 4 oz)   SpO2 100%   Breastfeeding No   BMI 29.50 kg/m²        Recent Results (from the past 24 hour(s))   TROPONIN I    Collection Time: 03/09/21  9:45 AM   Result Value Ref Range    Troponin-I, Qt. <0.05 <0.05 ng/mL   EKG, 12 LEAD, SUBSEQUENT    Collection Time: 03/09/21 10:32 AM   Result Value Ref Range    Ventricular Rate 57 BPM    Atrial Rate 57 BPM    P-R Interval 168 ms    QRS Duration 82 ms    Q-T Interval 430 ms    QTC Calculation (Bezet) 418 ms    Calculated P Axis 39 degrees    Calculated R Axis 49 degrees    Calculated T Axis 54 degrees    Diagnosis       Sinus bradycardia  Otherwise normal ECG  When compared with ECG of 08-MAR-2021 22:44, (Unconfirmed)  No significant change was found  Confirmed by [facility] JESSIENew WestonMariza ([id number]) on 3/9/2021 4:28:23 PM     TSH 3RD GENERATION    Collection Time: 03/09/21 11:45 AM Result Value Ref Range    TSH 0.50 0.36 - 3.74 uIU/mL   TROPONIN I    Collection Time: 03/09/21 11:50 AM   Result Value Ref Range    Troponin-I, Qt. <0.05 <0.05 ng/mL   ECHO ADULT COMPLETE    Collection Time: 03/09/21  3:30 PM   Result Value Ref Range    Pulmonic Regurgitant End Max Velocity 109.00 cm/s    AoV PG 5.00 mmHg    Aortic Valve Area by Continuity of Peak Velocity 2.42 cm2    IVSd 1.12 (A) 0.60 - 0.90 cm    LVIDd 4.57 3.90 - 5.30 cm    LVIDs 2.86 cm    LVOT d 1.80 cm    Pulmonic Regurgitant End Max Velocity 104.00 cm/s    LVOT Peak Gradient 4.00 mmHg    LVPWd 0.78 0.60 - 0.90 cm    LV E' Septal Velocity 10.30 cm/s    LV ED Vol A2C 95.40 cm3    BP EF 63.5 55.0 - 100.0 %    LV ES Vol A2C 23.40 cm3    E/E' septal 6.65     LV Ejection Fraction MOD 2C 63.0 %    Mitral Valve Deceleration Hayes 2,290.00 mm/s2    Mitral Valve Deceleration Hayes 2,290.00 mm/s2    Mitral Valve E Wave Deceleration Time 201.00 ms    Mitral Valve Pressure Half-time 120.00 ms    MV A Domingo 54.30 cm/s    MV E Domingo 68.50 cm/s    MVA (PHT) 1.83 cm2    MV E/A 1.26     Pulmonic Regurgitant End Max Velocity 107.00 cm/s    Pulmonic Valve Systolic Peak Instantaneous Gradient 5.00 mmHg    Pulmonic Regurgitant End Max Velocity 87.50 cm/s    Pulmonic Valve Systolic Peak Instantaneous Gradient 3.00 mmHg    P Vein A Dur 85.00 ms    Pulmonary Vein \"A\" Wave Velocity 28.50 cm/s    Est. RA Pressure 3.00 mmHg    RVIDd 3.00 cm    RVSP 23.00 mmHg    Tricuspid Valve Max Velocity 222.00 cm/s    Triscuspid Valve Regurgitation Peak Gradient 20.00 mmHg    Right Atrial Area 4C 10.70 cm2    LA Area 4C 8.46 cm2    LV Mass .5 67.0 - 162.0 g    LV Mass AL Index 78.0 43.0 - 95.0 g/m2    BERNARDO/BSA Pk Domingo 1.3 HV5/B0   METABOLIC PANEL, COMPREHENSIVE    Collection Time: 03/10/21  6:00 AM   Result Value Ref Range    Sodium 142 136 - 145 mmol/L    Potassium 4.8 3.5 - 5.1 mmol/L    Chloride 108 97 - 108 mmol/L    CO2 29 21 - 32 mmol/L    Anion gap 5 5 - 15 mmol/L Glucose 100 65 - 100 mg/dL    BUN 12 6 - 20 mg/dL    Creatinine 0.91 0.55 - 1.02 mg/dL    BUN/Creatinine ratio 13 12 - 20      GFR est AA >60 >60 ml/min/1.73m2    GFR est non-AA >60 >60 ml/min/1.73m2    Calcium 9.4 8.5 - 10.1 mg/dL    Bilirubin, total 0.3 0.2 - 1.0 mg/dL    AST (SGOT) 18 15 - 37 U/L    ALT (SGPT) 29 12 - 78 U/L    Alk. phosphatase 72 45 - 117 U/L    Protein, total 7.3 6.4 - 8.2 g/dL    Albumin 3.6 3.5 - 5.0 g/dL    Globulin 3.7 2.0 - 4.0 g/dL    A-G Ratio 1.0 (L) 1.1 - 2.2     CBC WITH AUTOMATED DIFF    Collection Time: 03/10/21  6:00 AM   Result Value Ref Range    WBC 5.2 3.6 - 11.0 K/uL    RBC 4.28 3.80 - 5.20 M/uL    HGB 12.8 11.5 - 16.0 g/dL    HCT 40.8 35.0 - 47.0 %    MCV 95.3 80.0 - 99.0 FL    MCH 29.9 26.0 - 34.0 PG    MCHC 31.4 30.0 - 36.5 g/dL    RDW 12.9 11.5 - 14.5 %    PLATELET 005 259 - 153 K/uL    MPV 10.6 8.9 - 12.9 FL    NEUTROPHILS 35 32 - 75 %    LYMPHOCYTES 54 (H) 12 - 49 %    MONOCYTES 8 5 - 13 %    EOSINOPHILS 2 0 - 7 %    BASOPHILS 1 0 - 1 %    IMMATURE GRANULOCYTES 0 0.0 - 0.5 %    ABS. NEUTROPHILS 1.8 1.8 - 8.0 K/UL    ABS. LYMPHOCYTES 2.8 0.8 - 3.5 K/UL    ABS. MONOCYTES 0.4 0.0 - 1.0 K/UL    ABS. EOSINOPHILS 0.1 0.0 - 0.4 K/UL    ABS. BASOPHILS 0.0 0.0 - 0.1 K/UL    ABS. IMM. GRANS. 0.0 0.00 - 0.04 K/UL    DF AUTOMATED     NUCLEAR CARDIAC STRESS TEST    Collection Time: 03/10/21  8:24 AM   Result Value Ref Range    Target  bpm     [unfilled]      Review of Systems    Constitutional: Negative for chills and fever. HENT: Negative. Eyes: Negative. Respiratory: Negative. Cardiovascular: Negative. Gastrointestinal: Negative for abdominal pain and nausea. Skin: Negative. Neurological: Negative. Physical Exam:      Constitutional: pt is oriented to person, place, and time. HENT:   Head: Normocephalic and atraumatic. Eyes: Pupils are equal, round, and reactive to light.  EOM are normal.   Cardiovascular: Normal rate, regular rhythm and normal heart sounds. Pulmonary/Chest: Breath sounds normal. No wheezes. No rales. Exhibits no tenderness. Abdominal: Soft. Bowel sounds are normal. There is no abdominal tenderness. There is no rebound and no guarding. Musculoskeletal: Normal range of motion. Neurological: pt is alert and oriented to person, place, and time. CTA CHEST W OR W WO CONT   Final Result      No pulmonary emboli. Mild right axillary adenopathy. Questionable small density in the right breast.   Recommend a follow-up mammogram for further evaluation, if clinically indicated. The results were called and verbally communicated to Dr. Shashi Lisa on 3/9/2021 at   1:15 AM.      XR CHEST PORT   Final Result   No acute cardiopulmonary process.            Recent Results (from the past 24 hour(s))   TROPONIN I    Collection Time: 03/09/21  9:45 AM   Result Value Ref Range    Troponin-I, Qt. <0.05 <0.05 ng/mL   EKG, 12 LEAD, SUBSEQUENT    Collection Time: 03/09/21 10:32 AM   Result Value Ref Range    Ventricular Rate 57 BPM    Atrial Rate 57 BPM    P-R Interval 168 ms    QRS Duration 82 ms    Q-T Interval 430 ms    QTC Calculation (Bezet) 418 ms    Calculated P Axis 39 degrees    Calculated R Axis 49 degrees    Calculated T Axis 54 degrees    Diagnosis       Sinus bradycardia  Otherwise normal ECG  When compared with ECG of 08-MAR-2021 22:44, (Unconfirmed)  No significant change was found  Confirmed by [facility] Mariza Foley ([id number]) on 3/9/2021 4:28:23 PM     TSH 3RD GENERATION    Collection Time: 03/09/21 11:45 AM   Result Value Ref Range    TSH 0.50 0.36 - 3.74 uIU/mL   TROPONIN I    Collection Time: 03/09/21 11:50 AM   Result Value Ref Range    Troponin-I, Qt. <0.05 <0.05 ng/mL   ECHO ADULT COMPLETE    Collection Time: 03/09/21  3:30 PM   Result Value Ref Range    Pulmonic Regurgitant End Max Velocity 109.00 cm/s    AoV PG 5.00 mmHg    Aortic Valve Area by Continuity of Peak Velocity 2.42 cm2    IVSd 1.12 (A) 0.60 - 0.90 cm    LVIDd 4.57 3.90 - 5.30 cm    LVIDs 2.86 cm    LVOT d 1.80 cm    Pulmonic Regurgitant End Max Velocity 104.00 cm/s    LVOT Peak Gradient 4.00 mmHg    LVPWd 0.78 0.60 - 0.90 cm    LV E' Septal Velocity 10.30 cm/s    LV ED Vol A2C 95.40 cm3    BP EF 63.5 55.0 - 100.0 %    LV ES Vol A2C 23.40 cm3    E/E' septal 6.65     LV Ejection Fraction MOD 2C 63.0 %    Mitral Valve Deceleration Brown 2,290.00 mm/s2    Mitral Valve Deceleration Brown 2,290.00 mm/s2    Mitral Valve E Wave Deceleration Time 201.00 ms    Mitral Valve Pressure Half-time 120.00 ms    MV A Domingo 54.30 cm/s    MV E Domingo 68.50 cm/s    MVA (PHT) 1.83 cm2    MV E/A 1.26     Pulmonic Regurgitant End Max Velocity 107.00 cm/s    Pulmonic Valve Systolic Peak Instantaneous Gradient 5.00 mmHg    Pulmonic Regurgitant End Max Velocity 87.50 cm/s    Pulmonic Valve Systolic Peak Instantaneous Gradient 3.00 mmHg    P Vein A Dur 85.00 ms    Pulmonary Vein \"A\" Wave Velocity 28.50 cm/s    Est. RA Pressure 3.00 mmHg    RVIDd 3.00 cm    RVSP 23.00 mmHg    Tricuspid Valve Max Velocity 222.00 cm/s    Triscuspid Valve Regurgitation Peak Gradient 20.00 mmHg    Right Atrial Area 4C 10.70 cm2    LA Area 4C 8.46 cm2    LV Mass .5 67.0 - 162.0 g    LV Mass AL Index 78.0 43.0 - 95.0 g/m2    BERNARDO/BSA Pk Domingo 1.3 PB3/S0   METABOLIC PANEL, COMPREHENSIVE    Collection Time: 03/10/21  6:00 AM   Result Value Ref Range    Sodium 142 136 - 145 mmol/L    Potassium 4.8 3.5 - 5.1 mmol/L    Chloride 108 97 - 108 mmol/L    CO2 29 21 - 32 mmol/L    Anion gap 5 5 - 15 mmol/L    Glucose 100 65 - 100 mg/dL    BUN 12 6 - 20 mg/dL    Creatinine 0.91 0.55 - 1.02 mg/dL    BUN/Creatinine ratio 13 12 - 20      GFR est AA >60 >60 ml/min/1.73m2    GFR est non-AA >60 >60 ml/min/1.73m2    Calcium 9.4 8.5 - 10.1 mg/dL    Bilirubin, total 0.3 0.2 - 1.0 mg/dL    AST (SGOT) 18 15 - 37 U/L    ALT (SGPT) 29 12 - 78 U/L    Alk.  phosphatase 72 45 - 117 U/L    Protein, total 7.3 6.4 - 8.2 g/dL    Albumin 3.6 3.5 - 5.0 g/dL    Globulin 3.7 2.0 - 4.0 g/dL    A-G Ratio 1.0 (L) 1.1 - 2.2     CBC WITH AUTOMATED DIFF    Collection Time: 03/10/21  6:00 AM   Result Value Ref Range    WBC 5.2 3.6 - 11.0 K/uL    RBC 4.28 3.80 - 5.20 M/uL    HGB 12.8 11.5 - 16.0 g/dL    HCT 40.8 35.0 - 47.0 %    MCV 95.3 80.0 - 99.0 FL    MCH 29.9 26.0 - 34.0 PG    MCHC 31.4 30.0 - 36.5 g/dL    RDW 12.9 11.5 - 14.5 %    PLATELET 235 013 - 906 K/uL    MPV 10.6 8.9 - 12.9 FL    NEUTROPHILS 35 32 - 75 %    LYMPHOCYTES 54 (H) 12 - 49 %    MONOCYTES 8 5 - 13 %    EOSINOPHILS 2 0 - 7 %    BASOPHILS 1 0 - 1 %    IMMATURE GRANULOCYTES 0 0.0 - 0.5 %    ABS. NEUTROPHILS 1.8 1.8 - 8.0 K/UL    ABS. LYMPHOCYTES 2.8 0.8 - 3.5 K/UL    ABS. MONOCYTES 0.4 0.0 - 1.0 K/UL    ABS. EOSINOPHILS 0.1 0.0 - 0.4 K/UL    ABS. BASOPHILS 0.0 0.0 - 0.1 K/UL    ABS. IMM. GRANS. 0.0 0.00 - 0.04 K/UL    DF AUTOMATED     NUCLEAR CARDIAC STRESS TEST    Collection Time: 03/10/21  8:24 AM   Result Value Ref Range    Target  bpm       Results     ** No results found for the last 336 hours. **           Labs:     Recent Labs     03/10/21  0600 03/08/21  2255   WBC 5.2 7.9   HGB 12.8 13.7   HCT 40.8 42.5    288     Recent Labs     03/10/21  0600 03/08/21  2255    139   K 4.8 4.4    106   CO2 29 28   BUN 12 15   CREA 0.91 1.15*    99   CA 9.4 9.0   MG  --  2.0     Recent Labs     03/10/21  0600 03/08/21  2255   ALT 29 40   AP 72 78   TBILI 0.3 0.3   TP 7.3 7.9   ALB 3.6 4.0   GLOB 3.7 3.9   LPSE  --  132     No results for input(s): INR, PTP, APTT, INREXT in the last 72 hours. No results for input(s): FE, TIBC, PSAT, FERR in the last 72 hours. No results found for: FOL, RBCF   No results for input(s): PH, PCO2, PO2 in the last 72 hours.   Recent Labs     03/09/21  1150 03/09/21  0945 03/08/21  2255   TROIQ <0.05 <0.05 <0.05     No results found for: CHOL, CHOLX, CHLST, CHOLV, HDL, HDLP, LDL, LDLC, DLDLP, TGLX, TRIGL, TRIGP, CHHD, CHHDX  No results found for: GLUCPOC  No results found for: COLOR, APPRN, SPGRU, REFSG, PHONG, PROTU, GLUCU, KETU, BILU, UROU, REILLY, LEUKU, GLUKE, EPSU, BACTU, WBCU, RBCU, CASTS, UCRY      Assessment:   Chest pain  Anxiety        Plan:   Follow by cardiology - awaiting nuclear stress test results today    Echo - EF 65% with normal function    Continue cardiac monitoring     Currently taking:  ASA 325mg once daily  Lipitor 40 mg once daily  Lovenox 40 mg injection daily          Current Facility-Administered Medications:     technetium sestamibi (CARDIOLITE) injection 33 millicurie, 33 millicurie, IntraVENous, ONCE, Angeline Grey NP    0.9% sodium chloride infusion, 75 mL/hr, IntraVENous, CONTINUOUS, Rickey Verdin MD    acetaminophen (TYLENOL) tablet 650 mg, 650 mg, Oral, Q6H PRN **OR** acetaminophen (TYLENOL) suppository 650 mg, 650 mg, Rectal, Q6H PRN, Faina Verdin MD    polyethylene glycol (MIRALAX) packet 17 g, 17 g, Oral, DAILY PRN, Faina Verdin MD    ondansetron (ZOFRAN ODT) tablet 4 mg, 4 mg, Oral, Q8H PRN **OR** ondansetron (ZOFRAN) injection 4 mg, 4 mg, IntraVENous, Q6H PRN, Faina Verdin MD    enoxaparin (LOVENOX) injection 40 mg, 40 mg, SubCUTAneous, DAILY, Faina Verdin MD, 40 mg at 03/09/21 1232    atorvastatin (LIPITOR) tablet 40 mg, 40 mg, Oral, QHS, Faina Verdin MD    aspirin tablet 325 mg, 325 mg, Oral, DAILY, Faina Verdin MD, 325 mg at 03/09/21 1232

## 2021-03-10 NOTE — PROGRESS NOTES
General Daily Progress Note          Patient Name:   Robert Lee       YOB: 1970       Age:  45 y.o. Admit Date: 3/8/2021      Subjective:     Patient is a 44 yo female with previous history of anxiety who was admitted for chest pressure and shortness of breath. Patient seen in her room this morning sitting up in bed. States that she feels fine and denies shortness of breath, chest pain, leg swelling, or diaphoresis.      /80  P57    Troponins are negative     Echo showed EF 65% with normal cavity size, systolic, and diastolic dysfunction    Nuclear stress test scheduled for today        Objective:     Visit Vitals  /80 (BP 1 Location: Left upper arm, BP Patient Position: At rest;Supine)   Pulse (!) 57   Temp 97.9 °F (36.6 °C)   Resp 20   Ht 5' 5\" (1.651 m)   Wt 80.4 kg (177 lb 4 oz)   SpO2 100%   Breastfeeding No   BMI 29.50 kg/m²        Recent Results (from the past 24 hour(s))   TSH 3RD GENERATION    Collection Time: 03/09/21 11:45 AM   Result Value Ref Range    TSH 0.50 0.36 - 3.74 uIU/mL   TROPONIN I    Collection Time: 03/09/21 11:50 AM   Result Value Ref Range    Troponin-I, Qt. <0.05 <0.05 ng/mL   ECHO ADULT COMPLETE    Collection Time: 03/09/21  3:30 PM   Result Value Ref Range    Pulmonic Regurgitant End Max Velocity 109.00 cm/s    AoV PG 5.00 mmHg    Aortic Valve Area by Continuity of Peak Velocity 2.42 cm2    IVSd 1.12 (A) 0.60 - 0.90 cm    LVIDd 4.57 3.90 - 5.30 cm    LVIDs 2.86 cm    LVOT d 1.80 cm    Pulmonic Regurgitant End Max Velocity 104.00 cm/s    LVOT Peak Gradient 4.00 mmHg    LVPWd 0.78 0.60 - 0.90 cm    LV E' Septal Velocity 10.30 cm/s    LV ED Vol A2C 95.40 cm3    BP EF 63.5 55.0 - 100.0 %    LV ES Vol A2C 23.40 cm3    E/E' septal 6.65     LV Ejection Fraction MOD 2C 63.0 %    Mitral Valve Deceleration Hendricks 2,290.00 mm/s2    Mitral Valve Deceleration Hendricks 2,290.00 mm/s2    Mitral Valve E Wave Deceleration Time 201.00 ms    Mitral Valve Pressure Half-time 120.00 ms    MV A Domingo 54.30 cm/s    MV E Domnigo 68.50 cm/s    MVA (PHT) 1.83 cm2    MV E/A 1.26     Pulmonic Regurgitant End Max Velocity 107.00 cm/s    Pulmonic Valve Systolic Peak Instantaneous Gradient 5.00 mmHg    Pulmonic Regurgitant End Max Velocity 87.50 cm/s    Pulmonic Valve Systolic Peak Instantaneous Gradient 3.00 mmHg    P Vein A Dur 85.00 ms    Pulmonary Vein \"A\" Wave Velocity 28.50 cm/s    Est. RA Pressure 3.00 mmHg    RVIDd 3.00 cm    RVSP 23.00 mmHg    Tricuspid Valve Max Velocity 222.00 cm/s    Triscuspid Valve Regurgitation Peak Gradient 20.00 mmHg    Right Atrial Area 4C 10.70 cm2    LA Area 4C 8.46 cm2    LV Mass .5 67.0 - 162.0 g    LV Mass AL Index 78.0 43.0 - 95.0 g/m2    BERNARDO/BSA Pk Domingo 1.3 HC0/S6   METABOLIC PANEL, COMPREHENSIVE    Collection Time: 03/10/21  6:00 AM   Result Value Ref Range    Sodium 142 136 - 145 mmol/L    Potassium 4.8 3.5 - 5.1 mmol/L    Chloride 108 97 - 108 mmol/L    CO2 29 21 - 32 mmol/L    Anion gap 5 5 - 15 mmol/L    Glucose 100 65 - 100 mg/dL    BUN 12 6 - 20 mg/dL    Creatinine 0.91 0.55 - 1.02 mg/dL    BUN/Creatinine ratio 13 12 - 20      GFR est AA >60 >60 ml/min/1.73m2    GFR est non-AA >60 >60 ml/min/1.73m2    Calcium 9.4 8.5 - 10.1 mg/dL    Bilirubin, total 0.3 0.2 - 1.0 mg/dL    AST (SGOT) 18 15 - 37 U/L    ALT (SGPT) 29 12 - 78 U/L    Alk.  phosphatase 72 45 - 117 U/L    Protein, total 7.3 6.4 - 8.2 g/dL    Albumin 3.6 3.5 - 5.0 g/dL    Globulin 3.7 2.0 - 4.0 g/dL    A-G Ratio 1.0 (L) 1.1 - 2.2     CBC WITH AUTOMATED DIFF    Collection Time: 03/10/21  6:00 AM   Result Value Ref Range    WBC 5.2 3.6 - 11.0 K/uL    RBC 4.28 3.80 - 5.20 M/uL    HGB 12.8 11.5 - 16.0 g/dL    HCT 40.8 35.0 - 47.0 %    MCV 95.3 80.0 - 99.0 FL    MCH 29.9 26.0 - 34.0 PG    MCHC 31.4 30.0 - 36.5 g/dL    RDW 12.9 11.5 - 14.5 %    PLATELET 947 354 - 268 K/uL    MPV 10.6 8.9 - 12.9 FL    NEUTROPHILS 35 32 - 75 %    LYMPHOCYTES 54 (H) 12 - 49 %    MONOCYTES 8 5 - 13 % EOSINOPHILS 2 0 - 7 %    BASOPHILS 1 0 - 1 %    IMMATURE GRANULOCYTES 0 0.0 - 0.5 %    ABS. NEUTROPHILS 1.8 1.8 - 8.0 K/UL    ABS. LYMPHOCYTES 2.8 0.8 - 3.5 K/UL    ABS. MONOCYTES 0.4 0.0 - 1.0 K/UL    ABS. EOSINOPHILS 0.1 0.0 - 0.4 K/UL    ABS. BASOPHILS 0.0 0.0 - 0.1 K/UL    ABS. IMM. GRANS. 0.0 0.00 - 0.04 K/UL    DF AUTOMATED     NUCLEAR CARDIAC STRESS TEST    Collection Time: 03/10/21  8:24 AM   Result Value Ref Range    Target  bpm     [unfilled]      Review of Systems    Constitutional: Negative for chills and fever. HENT: Negative. Eyes: Negative. Respiratory: Negative. Cardiovascular: Negative. Gastrointestinal: Negative for abdominal pain and nausea. Skin: Negative. Neurological: Negative. Physical Exam:      Constitutional: pt is oriented to person, place, and time. HENT:   Head: Normocephalic and atraumatic. Eyes: Pupils are equal, round, and reactive to light. EOM are normal.   Cardiovascular: Normal rate, regular rhythm and normal heart sounds. Pulmonary/Chest: Breath sounds normal. No wheezes. No rales. Exhibits no tenderness. Abdominal: Soft. Bowel sounds are normal. There is no abdominal tenderness. There is no rebound and no guarding. Musculoskeletal: Normal range of motion. Neurological: pt is alert and oriented to person, place, and time. CTA CHEST W OR W WO CONT   Final Result      No pulmonary emboli. Mild right axillary adenopathy. Questionable small density in the right breast.   Recommend a follow-up mammogram for further evaluation, if clinically indicated. The results were called and verbally communicated to Dr. Randy Fish on 3/9/2021 at   1:15 AM.      XR CHEST PORT   Final Result   No acute cardiopulmonary process.            Recent Results (from the past 24 hour(s))   TSH 3RD GENERATION    Collection Time: 03/09/21 11:45 AM   Result Value Ref Range    TSH 0.50 0.36 - 3.74 uIU/mL   TROPONIN I    Collection Time: 03/09/21 11:50 AM Result Value Ref Range    Troponin-I, Qt. <0.05 <0.05 ng/mL   ECHO ADULT COMPLETE    Collection Time: 03/09/21  3:30 PM   Result Value Ref Range    Pulmonic Regurgitant End Max Velocity 109.00 cm/s    AoV PG 5.00 mmHg    Aortic Valve Area by Continuity of Peak Velocity 2.42 cm2    IVSd 1.12 (A) 0.60 - 0.90 cm    LVIDd 4.57 3.90 - 5.30 cm    LVIDs 2.86 cm    LVOT d 1.80 cm    Pulmonic Regurgitant End Max Velocity 104.00 cm/s    LVOT Peak Gradient 4.00 mmHg    LVPWd 0.78 0.60 - 0.90 cm    LV E' Septal Velocity 10.30 cm/s    LV ED Vol A2C 95.40 cm3    BP EF 63.5 55.0 - 100.0 %    LV ES Vol A2C 23.40 cm3    E/E' septal 6.65     LV Ejection Fraction MOD 2C 63.0 %    Mitral Valve Deceleration Adams 2,290.00 mm/s2    Mitral Valve Deceleration Adams 2,290.00 mm/s2    Mitral Valve E Wave Deceleration Time 201.00 ms    Mitral Valve Pressure Half-time 120.00 ms    MV A Domingo 54.30 cm/s    MV E Domingo 68.50 cm/s    MVA (PHT) 1.83 cm2    MV E/A 1.26     Pulmonic Regurgitant End Max Velocity 107.00 cm/s    Pulmonic Valve Systolic Peak Instantaneous Gradient 5.00 mmHg    Pulmonic Regurgitant End Max Velocity 87.50 cm/s    Pulmonic Valve Systolic Peak Instantaneous Gradient 3.00 mmHg    P Vein A Dur 85.00 ms    Pulmonary Vein \"A\" Wave Velocity 28.50 cm/s    Est. RA Pressure 3.00 mmHg    RVIDd 3.00 cm    RVSP 23.00 mmHg    Tricuspid Valve Max Velocity 222.00 cm/s    Triscuspid Valve Regurgitation Peak Gradient 20.00 mmHg    Right Atrial Area 4C 10.70 cm2    LA Area 4C 8.46 cm2    LV Mass .5 67.0 - 162.0 g    LV Mass AL Index 78.0 43.0 - 95.0 g/m2    BERNARDO/BSA Pk Domingo 1.3 YL9/O2   METABOLIC PANEL, COMPREHENSIVE    Collection Time: 03/10/21  6:00 AM   Result Value Ref Range    Sodium 142 136 - 145 mmol/L    Potassium 4.8 3.5 - 5.1 mmol/L    Chloride 108 97 - 108 mmol/L    CO2 29 21 - 32 mmol/L    Anion gap 5 5 - 15 mmol/L    Glucose 100 65 - 100 mg/dL    BUN 12 6 - 20 mg/dL    Creatinine 0.91 0.55 - 1.02 mg/dL    BUN/Creatinine ratio 13 12 - 20      GFR est AA >60 >60 ml/min/1.73m2    GFR est non-AA >60 >60 ml/min/1.73m2    Calcium 9.4 8.5 - 10.1 mg/dL    Bilirubin, total 0.3 0.2 - 1.0 mg/dL    AST (SGOT) 18 15 - 37 U/L    ALT (SGPT) 29 12 - 78 U/L    Alk. phosphatase 72 45 - 117 U/L    Protein, total 7.3 6.4 - 8.2 g/dL    Albumin 3.6 3.5 - 5.0 g/dL    Globulin 3.7 2.0 - 4.0 g/dL    A-G Ratio 1.0 (L) 1.1 - 2.2     CBC WITH AUTOMATED DIFF    Collection Time: 03/10/21  6:00 AM   Result Value Ref Range    WBC 5.2 3.6 - 11.0 K/uL    RBC 4.28 3.80 - 5.20 M/uL    HGB 12.8 11.5 - 16.0 g/dL    HCT 40.8 35.0 - 47.0 %    MCV 95.3 80.0 - 99.0 FL    MCH 29.9 26.0 - 34.0 PG    MCHC 31.4 30.0 - 36.5 g/dL    RDW 12.9 11.5 - 14.5 %    PLATELET 276 150 - 400 K/uL    MPV 10.6 8.9 - 12.9 FL    NEUTROPHILS 35 32 - 75 %    LYMPHOCYTES 54 (H) 12 - 49 %    MONOCYTES 8 5 - 13 %    EOSINOPHILS 2 0 - 7 %    BASOPHILS 1 0 - 1 %    IMMATURE GRANULOCYTES 0 0.0 - 0.5 %    ABS. NEUTROPHILS 1.8 1.8 - 8.0 K/UL    ABS. LYMPHOCYTES 2.8 0.8 - 3.5 K/UL    ABS. MONOCYTES 0.4 0.0 - 1.0 K/UL    ABS. EOSINOPHILS 0.1 0.0 - 0.4 K/UL    ABS. BASOPHILS 0.0 0.0 - 0.1 K/UL    ABS. IMM. GRANS. 0.0 0.00 - 0.04 K/UL    DF AUTOMATED     NUCLEAR CARDIAC STRESS TEST    Collection Time: 03/10/21  8:24 AM   Result Value Ref Range    Target  bpm       Results     ** No results found for the last 336 hours. **           Labs:     Recent Labs     03/10/21  0600 03/08/21  2255   WBC 5.2 7.9   HGB 12.8 13.7   HCT 40.8 42.5    288     Recent Labs     03/10/21  0600 03/08/21  2255    139   K 4.8 4.4    106   CO2 29 28   BUN 12 15   CREA 0.91 1.15*    99   CA 9.4 9.0   MG  --  2.0     Recent Labs     03/10/21  0600 03/08/21  2255   ALT 29 40   AP 72 78   TBILI 0.3 0.3   TP 7.3 7.9   ALB 3.6 4.0   GLOB 3.7 3.9   LPSE  --  132     No results for input(s): INR, PTP, APTT, INREXT, INREXT in the last 72 hours.   No results for input(s): FE, TIBC, PSAT, FERR in the last 72 hours.  No results found for: FOL, RBCF   No results for input(s): PH, PCO2, PO2 in the last 72 hours.   Recent Labs     03/09/21  1150 03/09/21  0945 03/08/21  2255   TROIQ <0.05 <0.05 <0.05     No results found for: CHOL, CHOLX, CHLST, CHOLV, HDL, HDLP, LDL, LDLC, DLDLP, TGLX, TRIGL, TRIGP, CHHD, CHHDX  No results found for: GLUCPOC  No results found for: COLOR, APPRN, SPGRU, REFSG, PHONG, PROTU, GLUCU, KETU, BILU, UROU, REILLY, LEUKU, GLUKE, EPSU, BACTU, WBCU, RBCU, CASTS, UCRY      Assessment:   Chest pain  Anxiety        Plan:   Follow by cardiology - awaiting nuclear stress test results today    Echo - EF 65% with normal function    Continue cardiac monitoring     Currently taking:  ASA 325mg once daily  Lipitor 40 mg once daily  Lovenox 40 mg injection daily          Current Facility-Administered Medications:     0.9% sodium chloride infusion, 75 mL/hr, IntraVENous, CONTINUOUS, Bakari Verdin MD    acetaminophen (TYLENOL) tablet 650 mg, 650 mg, Oral, Q6H PRN **OR** acetaminophen (TYLENOL) suppository 650 mg, 650 mg, Rectal, Q6H PRN, Bakari Verdin MD    polyethylene glycol (MIRALAX) packet 17 g, 17 g, Oral, DAILY PRN, Faina Verdin MD    ondansetron (ZOFRAN ODT) tablet 4 mg, 4 mg, Oral, Q8H PRN **OR** ondansetron (ZOFRAN) injection 4 mg, 4 mg, IntraVENous, Q6H PRN, Faina Verdin MD    enoxaparin (LOVENOX) injection 40 mg, 40 mg, SubCUTAneous, DAILY, Faina Verdin MD, 40 mg at 03/09/21 1232    atorvastatin (LIPITOR) tablet 40 mg, 40 mg, Oral, QHS, Faina Verdin MD    aspirin tablet 325 mg, 325 mg, Oral, DAILY, Faina Verdin MD, 325 mg at 03/09/21 3744

## 2021-03-11 LAB
CHOLEST SERPL-MCNC: 183 MG/DL
HDLC SERPL-MCNC: 61 MG/DL
HDLC SERPL: 3 {RATIO} (ref 0–5)
LDLC SERPL CALC-MCNC: 105.8 MG/DL (ref 0–100)
LIPID PROFILE,FLP: ABNORMAL
TRIGL SERPL-MCNC: 81 MG/DL (ref ?–150)
VLDLC SERPL CALC-MCNC: 16.2 MG/DL

## 2021-10-09 ENCOUNTER — HOSPITAL ENCOUNTER (EMERGENCY)
Age: 51
Discharge: HOME | End: 2021-10-09
Attending: FAMILY MEDICINE
Payer: COMMERCIAL

## 2021-10-09 VITALS
TEMPERATURE: 98.8 F | HEART RATE: 74 BPM | DIASTOLIC BLOOD PRESSURE: 89 MMHG | BODY MASS INDEX: 29.16 KG/M2 | OXYGEN SATURATION: 100 % | HEIGHT: 65 IN | WEIGHT: 175 LBS | SYSTOLIC BLOOD PRESSURE: 124 MMHG | RESPIRATION RATE: 16 BRPM

## 2021-10-09 DIAGNOSIS — L30.9 DERMATITIS: Primary | ICD-10-CM

## 2021-10-09 PROCEDURE — 99282 EMERGENCY DEPT VISIT SF MDM: CPT

## 2021-10-09 RX ORDER — MAG HYDROX/ALUMINUM HYD/SIMETH 200-200-20
SUSPENSION, ORAL (FINAL DOSE FORM) ORAL 2 TIMES DAILY
Qty: 30 G | Refills: 0 | Status: SHIPPED | OUTPATIENT
Start: 2021-10-09

## 2021-10-09 NOTE — ED PROVIDER NOTES
EMERGENCY DEPARTMENT HISTORY AND PHYSICAL EXAM      Date: 10/9/2021  Patient Name: Clint Muro    History of Presenting Illness     Chief Complaint   Patient presents with    Rash       History Provided By: Patient    HPI: Clint Muro, 45 y.o. female presents to the ED with cc of pruritic rash onset 2 weeks ago. Is constant itchy rash located underneath the bilateral breasts and around the waistline. No prior history. No alleviating or aggravating factors. No associated symptoms. No OTC treatment or creams applied over the rash. She looked elsewhere for the similar rash but is only located on those 2 spots. No discharge or bleeding from the rash, fever body aches or chills. No recent change in home medications or new medications. There are no other complaints, changes, or physical findings at this time. PCP: Matthew, MD Bettina    No current facility-administered medications on file prior to encounter. No current outpatient medications on file prior to encounter. Past History     Past Medical History:  Past Medical History:   Diagnosis Date    Anxiety        Past Surgical History:  Past Surgical History:   Procedure Laterality Date    HX APPENDECTOMY      HX TUBAL LIGATION         Family History:  Family History   Family history unknown: Yes       Social History:  Social History     Tobacco Use    Smoking status: Never Smoker    Smokeless tobacco: Never Used   Substance Use Topics    Alcohol use: Yes     Comment: occasional    Drug use: Not Currently       Allergies: Allergies   Allergen Reactions    Paxil [Paroxetine Hcl] Unable to Obtain         Review of Systems     Review of Systems   Constitutional: Negative for chills and fever. HENT: Negative for congestion and sore throat. Eyes: Negative for photophobia and visual disturbance. Respiratory: Negative for cough and shortness of breath. Cardiovascular: Negative for chest pain and palpitations.    Gastrointestinal: Negative for nausea and vomiting. Genitourinary: Negative for dysuria and flank pain. Musculoskeletal: Negative for arthralgias, back pain and myalgias. Skin: Positive for rash. Negative for wound. Allergic/Immunologic: Negative for environmental allergies and food allergies. Neurological: Negative for light-headedness and headaches. All other systems reviewed and are negative. Physical Exam     Physical Exam  Vitals and nursing note reviewed. Constitutional:       Appearance: Normal appearance. She is normal weight. HENT:      Head: Normocephalic and atraumatic. Eyes:      Extraocular Movements: Extraocular movements intact. Conjunctiva/sclera: Conjunctivae normal.   Cardiovascular:      Rate and Rhythm: Normal rate and regular rhythm. Pulses: Normal pulses. Heart sounds: Normal heart sounds. Pulmonary:      Effort: Pulmonary effort is normal.      Breath sounds: Normal breath sounds. Skin:     General: Skin is warm. Capillary Refill: Capillary refill takes less than 2 seconds. Comments: Erythematous maculopapular rash circumferential around the waistline and underneath the bilateral breasts, pruritic. no open wounds, lesions, discharge, active bleeding, scratch marks, or tenderness. .   Neurological:      General: No focal deficit present. Mental Status: She is alert and oriented to person, place, and time. Psychiatric:         Mood and Affect: Mood normal.         Behavior: Behavior normal.         Thought Content: Thought content normal.         Judgment: Judgment normal.         Lab and Diagnostic Study Results     Labs -   No results found for this or any previous visit (from the past 12 hour(s)). Radiologic Studies -   @lastxrresult@  CT Results  (Last 48 hours)    None        CXR Results  (Last 48 hours)    None            Medical Decision Making   - I am the first provider for this patient.     - I reviewed the vital signs, available nursing notes, past medical history, past surgical history, family history and social history. - Initial assessment performed. The patients presenting problems have been discussed, and they are in agreement with the care plan formulated and outlined with them. I have encouraged them to ask questions as they arise throughout their visit. Vital Signs-Reviewed the patient's vital signs. Patient Vitals for the past 12 hrs:   Temp Pulse Resp BP SpO2   10/09/21 0910 98.8 °F (37.1 °C) 74 16 124/89 100 %       Records Reviewed: Nursing Notes    ED Course:          Provider Notes (Medical Decision Making):     MDM  Number of Diagnoses or Management Options  Risk of Complications, Morbidity, and/or Mortality  General comments: 9:39 AM  Patient is stable with no marked toxicity or distress. This is likely dermatitis possibly caused from irritation of the wire bra and elastic part of the spandex pants DC'd on hydrocortisone cream recommend follow-up with dermatology in 3 to 5 days. All questions were answered and there are no apparent barriers to comprehension or communication. The patient verbalized agreement with the diagnosis, treatment plan, and understanding of the follow-up instructions. The patient is appropriate for discharge; leaves the Emergency Department walking with a stable gait. Patient understands to return to the ED  for any new or worsening symptoms. Procedures   Medical Decision Makingedical Decision Making  Performed by: Nicolás Norman DO  PROCEDURES:  Procedures       Disposition   Disposition: Condition stable  DC- Adult Discharges: All of the diagnostic tests were reviewed and questions answered. Diagnosis, care plan and treatment options were discussed. The patient understands the instructions and will follow up as directed. The patients results have been reviewed with them. They have been counseled regarding their diagnosis.   The patient verbally convey understanding and agreement of the signs, symptoms, diagnosis, treatment and prognosis and additionally agrees to follow up as recommended with their PCP in 24 - 48 hours. They also agree with the care-plan and convey that all of their questions have been answered. I have also put together some discharge instructions for them that include: 1) educational information regarding their diagnosis, 2) how to care for their diagnosis at home, as well a 3) list of reasons why they would want to return to the ED prior to their follow-up appointment, should their condition change. Discharged    DISCHARGE PLAN:  1. Current Discharge Medication List      START taking these medications    Details   hydrocortisone (HYCORT) 1 % ointment Apply  to affected area two (2) times a day. use thin layer  Qty: 30 g, Refills: 0           2. Follow-up Information     Follow up With Specialties Details Why Contact Info    [facility]  In 3 days  [id number] [facility] [address]  [phone]        3. Return to ED if worse   4. Current Discharge Medication List      START taking these medications    Details   hydrocortisone (HYCORT) 1 % ointment Apply  to affected area two (2) times a day. use thin layer  Qty: 30 g, Refills: 0  Start date: 10/9/2021               Diagnosis     Clinical Impression:   1. Dermatitis        Attestations:    Joanna Vance, DO    Please note that this dictation was completed with [employer], the computer voice recognition software. Quite often unanticipated grammatical, syntax, homophones, and other interpretive errors are inadvertently transcribed by the computer software. Please disregard these errors. Please excuse any errors that have escaped final proofreading. Thank you.

## 2022-01-22 ENCOUNTER — HOSPITAL ENCOUNTER (EMERGENCY)
Age: 52
Discharge: HOME | End: 2022-01-22
Attending: EMERGENCY MEDICINE
Payer: COMMERCIAL

## 2022-01-22 ENCOUNTER — HOSPITAL ENCOUNTER (EMERGENCY)
Age: 52
Discharge: STILL ADMITTED OR ADMINISTRATIVE | End: 2022-01-22

## 2022-01-22 ENCOUNTER — APPOINTMENT (OUTPATIENT)
Dept: GENERAL RADIOLOGY | Age: 52
End: 2022-01-22
Attending: EMERGENCY MEDICINE
Payer: COMMERCIAL

## 2022-01-22 VITALS
TEMPERATURE: 98 F | DIASTOLIC BLOOD PRESSURE: 56 MMHG | OXYGEN SATURATION: 99 % | HEIGHT: 66 IN | WEIGHT: 175 LBS | SYSTOLIC BLOOD PRESSURE: 107 MMHG | RESPIRATION RATE: 16 BRPM | BODY MASS INDEX: 28.12 KG/M2 | HEART RATE: 64 BPM

## 2022-01-22 DIAGNOSIS — M94.0 ACUTE COSTOCHONDRITIS: Primary | ICD-10-CM

## 2022-01-22 DIAGNOSIS — R07.89 CHEST WALL PAIN: ICD-10-CM

## 2022-01-22 LAB
ALBUMIN SERPL-MCNC: 3.4 G/DL (ref 3.5–5)
ALBUMIN/GLOB SERPL: 0.8 {RATIO} (ref 1.1–2.2)
ALP SERPL-CCNC: 71 U/L (ref 45–117)
ALT SERPL-CCNC: 26 U/L (ref 12–78)
ANION GAP SERPL CALC-SCNC: 4 MMOL/L (ref 5–15)
AST SERPL W P-5'-P-CCNC: 40 U/L (ref 15–37)
ATRIAL RATE: 76 BPM
BASOPHILS # BLD: 0 K/UL (ref 0–0.1)
BASOPHILS NFR BLD: 1 % (ref 0–1)
BILIRUB SERPL-MCNC: 0.4 MG/DL (ref 0.2–1)
BUN SERPL-MCNC: 14 MG/DL (ref 6–20)
BUN/CREAT SERPL: 14 (ref 12–20)
CA-I BLD-MCNC: 9.5 MG/DL (ref 8.5–10.1)
CALCULATED P AXIS, ECG09: 71 DEGREES
CALCULATED R AXIS, ECG10: 68 DEGREES
CALCULATED T AXIS, ECG11: 59 DEGREES
CHLORIDE SERPL-SCNC: 109 MMOL/L (ref 97–108)
CO2 SERPL-SCNC: 26 MMOL/L (ref 21–32)
CREAT SERPL-MCNC: 0.99 MG/DL (ref 0.55–1.02)
DIAGNOSIS, 93000: NORMAL
DIFFERENTIAL METHOD BLD: NORMAL
EOSINOPHIL # BLD: 0.1 K/UL (ref 0–0.4)
EOSINOPHIL NFR BLD: 1 % (ref 0–7)
ERYTHROCYTE [DISTWIDTH] IN BLOOD BY AUTOMATED COUNT: 12.5 % (ref 11.5–14.5)
GLOBULIN SER CALC-MCNC: 4.4 G/DL (ref 2–4)
GLUCOSE SERPL-MCNC: 124 MG/DL (ref 65–100)
HCT VFR BLD AUTO: 42.1 % (ref 35–47)
HGB BLD-MCNC: 13.7 G/DL (ref 11.5–16)
IMM GRANULOCYTES # BLD AUTO: 0 K/UL (ref 0–0.04)
IMM GRANULOCYTES NFR BLD AUTO: 0 % (ref 0–0.5)
LYMPHOCYTES # BLD: 1.8 K/UL (ref 0.8–3.5)
LYMPHOCYTES NFR BLD: 34 % (ref 12–49)
MCH RBC QN AUTO: 30.4 PG (ref 26–34)
MCHC RBC AUTO-ENTMCNC: 32.5 G/DL (ref 30–36.5)
MCV RBC AUTO: 93.3 FL (ref 80–99)
MONOCYTES # BLD: 0.5 K/UL (ref 0–1)
MONOCYTES NFR BLD: 9 % (ref 5–13)
NEUTS SEG # BLD: 2.9 K/UL (ref 1.8–8)
NEUTS SEG NFR BLD: 55 % (ref 32–75)
NRBC # BLD: 0 K/UL (ref 0–0.01)
NRBC BLD-RTO: 0 PER 100 WBC
P-R INTERVAL, ECG05: 156 MS
PLATELET # BLD AUTO: 290 K/UL (ref 150–400)
PMV BLD AUTO: 9.8 FL (ref 8.9–12.9)
POTASSIUM SERPL-SCNC: 5.8 MMOL/L (ref 3.5–5.1)
PROT SERPL-MCNC: 7.8 G/DL (ref 6.4–8.2)
Q-T INTERVAL, ECG07: 362 MS
QRS DURATION, ECG06: 68 MS
QTC CALCULATION (BEZET), ECG08: 407 MS
RBC # BLD AUTO: 4.51 M/UL (ref 3.8–5.2)
SODIUM SERPL-SCNC: 139 MMOL/L (ref 136–145)
TROPONIN-HIGH SENSITIVITY: 6 NG/L (ref 0–51)
VENTRICULAR RATE, ECG03: 76 BPM
WBC # BLD AUTO: 5.3 K/UL (ref 3.6–11)

## 2022-01-22 PROCEDURE — 99281 EMR DPT VST MAYX REQ PHY/QHP: CPT

## 2022-01-22 PROCEDURE — 80053 COMPREHEN METABOLIC PANEL: CPT

## 2022-01-22 PROCEDURE — 74011250637 HC RX REV CODE- 250/637: Performed by: EMERGENCY MEDICINE

## 2022-01-22 PROCEDURE — 84484 ASSAY OF TROPONIN QUANT: CPT

## 2022-01-22 PROCEDURE — 74011250636 HC RX REV CODE- 250/636: Performed by: EMERGENCY MEDICINE

## 2022-01-22 PROCEDURE — 71045 X-RAY EXAM CHEST 1 VIEW: CPT

## 2022-01-22 PROCEDURE — 93005 ELECTROCARDIOGRAM TRACING: CPT

## 2022-01-22 PROCEDURE — 96374 THER/PROPH/DIAG INJ IV PUSH: CPT

## 2022-01-22 PROCEDURE — 99285 EMERGENCY DEPT VISIT HI MDM: CPT

## 2022-01-22 PROCEDURE — 85025 COMPLETE CBC W/AUTO DIFF WBC: CPT

## 2022-01-22 PROCEDURE — 36415 COLL VENOUS BLD VENIPUNCTURE: CPT

## 2022-01-22 RX ORDER — ACETAMINOPHEN 500 MG
1000 TABLET ORAL
Status: COMPLETED | OUTPATIENT
Start: 2022-01-22 | End: 2022-01-22

## 2022-01-22 RX ORDER — KETOROLAC TROMETHAMINE 30 MG/ML
30 INJECTION, SOLUTION INTRAMUSCULAR; INTRAVENOUS
Status: COMPLETED | OUTPATIENT
Start: 2022-01-22 | End: 2022-01-22

## 2022-01-22 RX ORDER — DICLOFENAC SODIUM 10 MG/G
4 GEL TOPICAL
Status: COMPLETED | OUTPATIENT
Start: 2022-01-22 | End: 2022-01-22

## 2022-01-22 RX ADMIN — ACETAMINOPHEN 1000 MG: 500 TABLET ORAL at 13:14

## 2022-01-22 RX ADMIN — KETOROLAC TROMETHAMINE 30 MG: 30 INJECTION, SOLUTION INTRAMUSCULAR at 13:14

## 2022-01-22 RX ADMIN — DICLOFENAC 4 G: 10 GEL TOPICAL at 15:59

## 2022-01-22 NOTE — DISCHARGE INSTRUCTIONS
Use the following combination of medications for pain control but remember this will be sore for several days and may come and go iver the next few weeks:     TYLENOL EXTRA STRENGTH 2 tabs every 6 hours   AND   Ibuprofen 3 tabs AT THE SAME TIME. VOLTAREN GEL  Rub 2 inches into the sore area every 6 hours. Take all medications on a tight schedule for maximum benefit. Return to the ED as needed or if you become concerned.

## 2022-01-22 NOTE — ED PROVIDER NOTES
EMERGENCY DEPARTMENT HISTORY AND PHYSICAL EXAM        Date: 1/22/2022  Patient Name: Deep Villavicencio    History of Presenting Illness     Chief Complaint   Patient presents with    Chest Pain       11:19 AM    History Provided By: Patient    HPI: Deep Villavicencio, 45 y.o. 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 Patient states that the discomfort was was initially dull but became progressively more severe overnight. Patient denies any nausea, vomiting, shortness of breath, direct trauma, lightheadedness, sweats, orthopnea, NAPOLES,    Patient attempted to treat with 1 dose of Tylenol which did slightly improve her discomfort but did not need it so she came to the ED this morning    PCP: Bettina Castro MD    Current Outpatient Medications   Medication Sig Dispense Refill    hydrocortisone (HYCORT) 1 % ointment Apply  to affected area two (2) times a day. use thin layer 30 g 0       Past History     Past Medical History:  Past Medical History:   Diagnosis Date    Anxiety        Past Surgical History:  Past Surgical History:   Procedure Laterality Date    HX APPENDECTOMY      HX TUBAL LIGATION         Family History:  Family History   Family history unknown: Yes       Social History:  Social History     Tobacco Use    Smoking status: Never Smoker    Smokeless tobacco: Never Used   Substance Use Topics    Alcohol use: Yes     Comment: occasional    Drug use: Not Currently       Allergies: Allergies   Allergen Reactions    Paxil [Paroxetine Hcl] Unable to Obtain       Review of Systems   Review of Systems   Constitutional: Negative for chills, diaphoresis and fever. HENT: Negative for congestion, sore throat and trouble swallowing. Eyes: Negative for visual disturbance. Respiratory: Negative for cough and shortness of breath.     Cardiovascular: Positive for chest pain. Negative for palpitations. Gastrointestinal: Negative for abdominal pain, diarrhea, nausea and vomiting. Endocrine: Negative for polydipsia, polyphagia and polyuria. Genitourinary: Negative for dysuria, frequency, hematuria and urgency. Musculoskeletal: Negative for gait problem and neck pain. Skin: Negative for rash. Neurological: Negative for dizziness, syncope and headaches. Physical Exam   Physical Exam  Vitals and nursing note reviewed. Constitutional:       General: She is not in acute distress. Appearance: She is well-developed. She is not ill-appearing. Comments: GCS 13 T; not ill-appearing well groomed   HENT:      Head: Normocephalic and atraumatic. Nose: Nose normal.      Mouth/Throat:      Mouth: Mucous membranes are moist.      Pharynx: No posterior oropharyngeal erythema. Eyes:      General: Vision grossly intact. Extraocular Movements: Extraocular movements intact. Conjunctiva/sclera: Conjunctivae normal.      Pupils: Pupils are equal, round, and reactive to light. Neck:      Vascular: No JVD. Trachea: No tracheal deviation. Cardiovascular:      Rate and Rhythm: Normal rate and regular rhythm. Pulses: Normal pulses. Carotid pulses are 2+ on the right side and 2+ on the left side. Radial pulses are 2+ on the right side and 2+ on the left side. Femoral pulses are 2+ on the right side and 2+ on the left side. Popliteal pulses are 2+ on the right side and 2+ on the left side. Dorsalis pedis pulses are 2+ on the right side and 2+ on the left side. Posterior tibial pulses are 2+ on the right side and 2+ on the left side. Heart sounds: Normal heart sounds. Pulmonary:      Effort: Pulmonary effort is normal.      Breath sounds: Normal breath sounds and air entry. No wheezing or rhonchi. Chest:      Chest wall: Tenderness present.       Comments: Patient with tenderness to palpation of the sternum   No palpable rib dislocations or clicks. No contusions bruises or hematomas  Abdominal:      General: Bowel sounds are normal.      Palpations: Abdomen is soft. Tenderness: There is no abdominal tenderness. There is no guarding or rebound. Musculoskeletal:         General: No swelling or deformity. Right shoulder: No swelling. Normal range of motion. Cervical back: Neck supple. Right lower leg: No edema. Left lower leg: No edema. Skin:     General: Skin is warm and dry. Capillary Refill: Capillary refill takes less than 2 seconds. Findings: No signs of injury or rash. Neurological:      General: No focal deficit present. Mental Status: She is alert. Psychiatric:         Behavior: Behavior is cooperative. Diagnostic Study Results     Labs -     57/57/37 1866  METABOLIC PANEL, COMPREHENSIVE   Collected: 01/22/22 1156  Final result  Specimen: Serum or Plasma     Sodium 139 mmol/L   Potassium 5.8 High  mmol/L    Chloride 109 High  mmol/L   CO2 26 mmol/L   Anion gap 4 Low  mmol/L   Glucose 124 High  mg/dL   BUN 14 mg/dL   Creatinine 0.99 mg/dL   BUN/Creatinine ratio 14     GFR est AA >60 ml/min/1.73m2   GFR est non-AA 59 Low  ml/min/1.73m2    Calcium 9.5 mg/dL   Bilirubin, total 0.4 mg/dL   AST (SGOT) 40 High  U/L    ALT (SGPT) 26 U/L   Alk.  phosphatase 71 U/L   Protein, total 7.8 g/dL   Albumin 3.4 Low  g/dL   Globulin 4.4 High  g/dL   A-G Ratio 0.8 Low             01/22/22 1232  TROPONIN-HIGH SENSITIVITY   Collected: 01/22/22 1156  Final result  Specimen: Plasma     Troponin-High Sensitivity 6 ng/L          01/22/22 1221  CBC WITH AUTOMATED DIFF   Collected: 01/22/22 1156  Final result  Specimen: Whole Blood     WBC 5.3 K/uL   RBC 4.51 M/uL   HGB 13.7 g/dL   HCT 42.1 %   MCV 93.3 FL   MCH 30.4 PG   MCHC 32.5 g/dL   RDW 12.5 %   PLATELET 107 K/uL   MPV 9.8 FL   NRBC 0.0  WBC   ABSOLUTE NRBC 0.00 K/uL   NEUTROPHILS 55 % LYMPHOCYTES 34 %   MONOCYTES 9 %   EOSINOPHILS 1 %   BASOPHILS 1 %   IMMATURE GRANULOCYTES 0 %   ABS. NEUTROPHILS 2.9 K/UL   ABS. LYMPHOCYTES 1.8 K/UL   ABS. MONOCYTES 0.5 K/UL   ABS. EOSINOPHILS 0.1 K/UL   ABS. BASOPHILS 0.0 K/UL   ABS. IMM. GRANS. 0.0 K/UL   DF AUTOMATED            Radiologic Studies -   XR CHEST PORT   Final Result   No demonstrated acute cardiopulmonary disease. CT Results  (Last 48 hours)    None        CXR Results  (Last 48 hours)    None          Medical Decision Making and ED Course     I have also reviewed the vital signs, available nursing notes, past medical history, past surgical history, family history and social history. Vital Signs - Reviewed the patient's vital signs. No data found. EKG interpretation: (Preliminary): Performed at 1052  Rhythm: normal sinus rhythm; and regular . Rate (approx.): 72; Axis: normal;       Medical Decision Making/Diff Dx:  48-year-old otherwise healthy female presents with musculoskeletal anterior chest pain with physical exam supports the history she provides a mechanism of injury. EKG shows no acute abnormality. Will review screening labs chest x-ray ordered by nursing staff and plan for discharge with outpatient follow-up. ED course/Re-evaluation/Consultations/Other          Procedures     PROCEDURES:  Procedures  Jared Cheatham MD      Disposition     Discharged    DISCHARGE PLAN:  1. Discharge Medication List as of 1/22/2022  3:49 PM        2. Current Discharge Medication List      CONTINUE these medications which have NOT CHANGED    Details   hydrocortisone (HYCORT) 1 % ointment Apply  to affected area two (2) times a day. use thin layer  Qty: 30 g, Refills: 0           3.    Follow-up Information     Follow up With Specialties Details Why Contact Info    Bozena Kim MD [facility] Schedule an appointment as soon as possible for a visit in 7 days  [facility] [address] [address] [phone] 4. Return to ED if worse     Diagnosis     Clinical impression:   1. Acute costochondritis    2.  Chest wall pain    :

## 2022-01-22 NOTE — ED TRIAGE NOTES
Patient started with mid sternal chest pain this AM, no cardiac hx. Describes the pain as achy, pressure like, worse upon movement.

## 2022-01-22 NOTE — Clinical Note
[address] [facility] EMERGENCY DEPT  [place] [id number]  [phone]    Work/School Note    Date: 1/22/2022    To Whom It May concern:    Dakota Kim was seen and treated today in the emergency room by the following provider(s):  Attending Provider: Shyla Turner MD.      Dakota Kim is excused from work/school on 1/22/2022 through 1/24/2022. She is medically clear to return to work/school on 1/25/2022.          Sincerely,          Lewis Doss MD

## 2022-03-19 PROBLEM — R07.9 CHEST PAIN: Status: ACTIVE | Noted: 2021-03-09

## 2022-04-01 ENCOUNTER — HOSPITAL ENCOUNTER (EMERGENCY)
Age: 52
Discharge: HOME | End: 2022-04-01
Attending: EMERGENCY MEDICINE
Payer: COMMERCIAL

## 2022-04-01 ENCOUNTER — APPOINTMENT (OUTPATIENT)
Dept: GENERAL RADIOLOGY | Age: 52
End: 2022-04-01
Attending: EMERGENCY MEDICINE
Payer: COMMERCIAL

## 2022-04-01 VITALS
RESPIRATION RATE: 16 BRPM | HEART RATE: 81 BPM | HEIGHT: 65 IN | BODY MASS INDEX: 28.32 KG/M2 | WEIGHT: 170 LBS | OXYGEN SATURATION: 100 % | DIASTOLIC BLOOD PRESSURE: 85 MMHG | TEMPERATURE: 98.2 F | SYSTOLIC BLOOD PRESSURE: 135 MMHG

## 2022-04-01 DIAGNOSIS — M79.644 FINGER PAIN, RIGHT: Primary | ICD-10-CM

## 2022-04-01 PROCEDURE — 99283 EMERGENCY DEPT VISIT LOW MDM: CPT

## 2022-04-01 PROCEDURE — 73140 X-RAY EXAM OF FINGER(S): CPT

## 2022-04-01 RX ORDER — NAPROXEN 250 MG/1
250 TABLET ORAL
Qty: 20 TABLET | Refills: 0 | Status: SHIPPED | OUTPATIENT
Start: 2022-04-01 | End: 2022-04-11

## 2022-04-01 NOTE — DISCHARGE INSTRUCTIONS
Thank you! Thank you for allowing me to care for you in the emergency department. I sincerely hope that you are satisfied with your visit today. It is my goal to provide you with excellent care. Below you will find a list of your labs and imaging from your visit today. Should you have any questions regarding these results please do not hesitate to call the emergency department. Labs -   No results found for this or any previous visit (from the past 12 hour(s)). Radiologic Studies -   XR 5TH FINGER RT MIN 2 V   Final Result        CT Results  (Last 48 hours)      None          CXR Results  (Last 48 hours)      None               If you feel that you have not received excellent quality care or timely care, please ask to speak to the nurse manager. Please choose us in the future for your continued health care needs. ------------------------------------------------------------------------------------------------------------  The exam and treatment you received in the Emergency Department were for an urgent problem and are not intended as complete care. It is important that you follow-up with a doctor, nurse practitioner, or physician assistant to:  (1) confirm your diagnosis,  (2) re-evaluation of changes in your illness and treatment, and  (3) for ongoing care. If your symptoms become worse or you do not improve as expected and you are unable to reach your usual health care provider, you should return to the Emergency Department. We are available 24 hours a day. Please take your discharge instructions with you when you go to your follow-up appointment. If you have any problem arranging a follow-up appointment, contact the Emergency Department immediately. If a prescription has been provided, please have it filled as soon as possible to prevent a delay in treatment. Read the entire medication instruction sheet provided to you by the pharmacy.  If you have any questions or reservations about taking the medication due to side effects or interactions with other medications, please call your primary care physician or contact the ER to speak with the charge nurse. Make an appointment with your family doctor or the physician you were referred to for follow-up of this visit as instructed on your discharge paperwork, as this is a mandatory follow-up. Return to the ER if you are unable to be seen or if you are unable to be seen in a timely manner. If you have any problem arranging the follow-up visit, contact the Emergency Department immediately.

## 2022-04-01 NOTE — ED TRIAGE NOTES
pt states possible injury to right pinky x 6 years ago, woke up this morning with swelling redness and pain. used Diclofenc gel which relieved pt.  Not sure if she has \" arthritis, or fracture\"

## 2022-04-01 NOTE — ED PROVIDER NOTES
EMERGENCY DEPARTMENT HISTORY AND PHYSICAL EXAM      Date: 4/1/2022  Patient Name: Sky Quintana    History of Presenting Illness     Chief Complaint   Patient presents with    Finger Pain     pt states possible injury to right pinky x 6 years ago, woke up this morning with swelling redness and pain. used Diclofenc gel which relieved pt. Not sure if she has \" arthritis, or fracture\"        History Provided By: Patient    HPI: Sky Quintana, 47 y.o. female with a past medical history significant No significant past medical history presents to the ED with cc of right fifth finger pain, swelling, erythema x1 day. Patient reports no inciting injury that she can recall. Patient states that she is having difficulty bending at the DIP as well as PIP joint. Patient reports remote injury approximately 6 years ago and is concerned about the development of potential arthritis. Patient with subjective improvement of her symptoms with application of ice and diclofenac gel. There are no other complaints, changes, or physical findings at this time. PCP: Other, MD Bettina    No current facility-administered medications on file prior to encounter. Current Outpatient Medications on File Prior to Encounter   Medication Sig Dispense Refill    hydrocortisone (HYCORT) 1 % ointment Apply  to affected area two (2) times a day. use thin layer 30 g 0       Past History     Past Medical History:  Past Medical History:   Diagnosis Date    Anxiety        Past Surgical History:  Past Surgical History:   Procedure Laterality Date    HX APPENDECTOMY      HX TUBAL LIGATION         Family History:  Family History   Family history unknown: Yes       Social History:  Social History     Tobacco Use    Smoking status: Never Smoker    Smokeless tobacco: Never Used   Substance Use Topics    Alcohol use: Yes     Comment: occasional    Drug use: Not Currently       Allergies:   Allergies   Allergen Reactions    Paxil [Paroxetine Hcl] Unable to Obtain         Review of Systems     Review of Systems   Constitutional: Negative for chills and fever. HENT: Negative for congestion and sore throat. Eyes: Negative for pain and visual disturbance. Respiratory: Negative for cough and shortness of breath. Cardiovascular: Negative for chest pain and palpitations. Gastrointestinal: Negative for constipation, diarrhea, nausea and vomiting. Genitourinary: Negative for dysuria and frequency. Musculoskeletal: Positive for arthralgias. Negative for myalgias. Skin: Positive for color change. Negative for rash. Neurological: Negative for dizziness, weakness, light-headedness and headaches. Psychiatric/Behavioral: Negative for dysphoric mood and sleep disturbance. Physical Exam     Physical Exam  Constitutional:       Appearance: Normal appearance. HENT:      Head: Normocephalic and atraumatic. Right Ear: External ear normal.      Left Ear: External ear normal.      Nose: Nose normal.      Mouth/Throat:      Mouth: Mucous membranes are moist.   Eyes:      Extraocular Movements: Extraocular movements intact. Conjunctiva/sclera: Conjunctivae normal.   Cardiovascular:      Rate and Rhythm: Normal rate and regular rhythm. Pulses: Normal pulses. Heart sounds: Normal heart sounds. Pulmonary:      Effort: Pulmonary effort is normal.      Breath sounds: Normal breath sounds. Abdominal:      General: Abdomen is flat. There is no distension. Palpations: Abdomen is soft. Tenderness: There is no abdominal tenderness. Musculoskeletal:      Right hand: Decreased range of motion. Cervical back: Normal range of motion. Comments: Pain at the right fifth DIP and PIP joint with active range of motion. No pain with passive range of motion. No tenderness to palpation   Skin:     General: Skin is warm and dry. Capillary Refill: Capillary refill takes less than 2 seconds.    Neurological:      General: No focal deficit present. Mental Status: She is alert and oriented to person, place, and time. Mental status is at baseline. Psychiatric:         Mood and Affect: Mood normal.         Behavior: Behavior normal.         Lab and Diagnostic Study Results     Labs -   No results found for this or any previous visit (from the past 12 hour(s)). Radiologic Studies -   @lastxrresult@  CT Results  (Last 48 hours)    None        CXR Results  (Last 48 hours)    None            Medical Decision Making   - I am the first provider for this patient. - I reviewed the vital signs, available nursing notes, past medical history, past surgical history, family history and social history. - Initial assessment performed. The patients presenting problems have been discussed, and they are in agreement with the care plan formulated and outlined with them. I have encouraged them to ask questions as they arise throughout their visit. Vital Signs-Reviewed the patient's vital signs. Patient Vitals for the past 12 hrs:   Temp Pulse Resp BP SpO2   04/01/22 0842 98.2 °F (36.8 °C) 81 16 135/85 100 %       Records Reviewed: Nursing Notes    The patient presents with finger pain with a differential diagnosis of arthritis, gout, tendon injury, fracture, dislocation      ED Course:          Provider Notes (Medical Decision Making):   50-year-old otherwise healthy female presenting to the ED for evaluation of 1 day of right fifth finger pain. Patient reports no inciting recent injury, however does report remote injury approximately 6 years ago. On physical examination, patient has no tenderness to palpation. She has pain at the DIP and PIP joint with active range of motion, however no pain with passive range of motion. There is mild erythema noted to the right fifth PIP joint without evidence of ian cellulitis or gout. X-ray of the right fifth finger demonstrating no acute abnormality.   Will place in splint    Patient will be discharged home with orthopedic follow-up information. MDM       Procedures   Medical Decision Makingedical Decision Making  Performed by: Anderson Craig DO  PROCEDURES:  Procedures       Disposition   Disposition: Condition stable  DC- Adult Discharges: All of the diagnostic tests were reviewed and questions answered. Diagnosis, care plan and treatment options were discussed. The patient understands the instructions and will follow up as directed. The patients results have been reviewed with them. They have been counseled regarding their diagnosis. The patient verbally convey understanding and agreement of the signs, symptoms, diagnosis, treatment and prognosis and additionally agrees to follow up as recommended with their PCP in 24 - 48 hours. They also agree with the care-plan and convey that all of their questions have been answered. I have also put together some discharge instructions for them that include: 1) educational information regarding their diagnosis, 2) how to care for their diagnosis at home, as well a 3) list of reasons why they would want to return to the ED prior to their follow-up appointment, should their condition change. DC-The patient was given verbal follow-up instructions    Discharged    DISCHARGE PLAN:  1. Current Discharge Medication List      CONTINUE these medications which have NOT CHANGED    Details   hydrocortisone (HYCORT) 1 % ointment Apply  to affected area two (2) times a day. use thin layer  Qty: 30 g, Refills: 0           2. Follow-up Information     Follow up With Specialties Details Why Contact Info    Sadie Gray MD Orthopedic Surgery Schedule an appointment as soon as possible for a visit   [address] [place] [address]  [phone]      [address] [facility] Emergency Medicine  As needed, If symptoms worsen [facility]  [phone]        3. Return to ED if worse   4.    Current Discharge Medication List      START taking these medications    Details   naproxen (NAPROSYN) 250 mg tablet Take 1 Tablet by mouth three (3) times daily as needed for Pain for up to 10 days. Qty: 20 Tablet, Refills: 0  Start date: 4/1/2022, End date: 4/11/2022               Diagnosis     Clinical Impression:   1. Finger pain, right        Attestations:    Michael Patton, DO    Please note that this dictation was completed with [employer], the computer voice recognition software. Quite often unanticipated grammatical, syntax, homophones, and other interpretive errors are inadvertently transcribed by the computer software. Please disregard these errors. Please excuse any errors that have escaped final proofreading. Thank you.

## 2022-05-25 ENCOUNTER — TRANSCRIBE ORDER (OUTPATIENT)
Dept: SCHEDULING | Age: 52
End: 2022-05-25

## 2022-05-25 DIAGNOSIS — Z12.31 SCREENING MAMMOGRAM FOR HIGH-RISK PATIENT: Primary | ICD-10-CM

## 2022-06-23 ENCOUNTER — HOSPITAL ENCOUNTER (OUTPATIENT)
Dept: MAMMOGRAPHY | Age: 52
Discharge: HOME | End: 2022-06-23
Payer: COMMERCIAL

## 2022-06-23 DIAGNOSIS — Z12.31 SCREENING MAMMOGRAM FOR HIGH-RISK PATIENT: ICD-10-CM

## 2022-06-23 PROCEDURE — 77063 BREAST TOMOSYNTHESIS BI: CPT

## 2022-06-29 ENCOUNTER — OFFICE VISIT (OUTPATIENT)
Dept: OBGYN CLINIC | Age: 52
End: 2022-06-29
Payer: COMMERCIAL

## 2022-06-29 VITALS
SYSTOLIC BLOOD PRESSURE: 124 MMHG | BODY MASS INDEX: 28.84 KG/M2 | HEIGHT: 65 IN | OXYGEN SATURATION: 100 % | TEMPERATURE: 97.8 F | DIASTOLIC BLOOD PRESSURE: 81 MMHG | WEIGHT: 173.13 LBS | HEART RATE: 69 BPM

## 2022-06-29 DIAGNOSIS — R87.611 PAP SMEAR OF CERVIX WITH ASCUS, CANNOT EXCLUDE HGSIL: Primary | ICD-10-CM

## 2022-06-29 PROCEDURE — 57452 EXAM OF CERVIX W/SCOPE: CPT | Performed by: OBSTETRICS & GYNECOLOGY

## 2022-06-29 NOTE — PROGRESS NOTES
Subjective:  Chief Complaints:        1. Recent abnormal pap smear. 2. Here for Colposcopy.:    HPI:         Jin Han is a 47 y.o. female who came to today referred by her PCP for colposcopy after findings of atypical squamous cells associated with high risk human papilloma virus on a routine pap smear. ROS:  RESPIRATORY:     Negative for shortness of breath, chest pain, chest congestion, cough. CARDIOLOGY:    Negative for dizziness, chest pain, palpitations. GASTROENTEROLOGY:    Negative for nausea, heartburn, vomiting, abdominal pain, constipation. FEMALE REPRODUCTIVE:    Negative for abnormal vaginal discharge, abnormal vaginal bleeding. UROLOGY:    Negative for difficulty urinating, voiding dysfunction, frequent urination, dysuria. Gyn History:    OB History:  OB History    Para Term  AB Living   2 2 2         SAB IAB Ectopic Molar Multiple Live Births             2      # Outcome Date GA Lbr [place]/2nd Weight Sex Delivery Anes PTL Lv   2 Term            1 Term                Current Outpatient Medications   Medication Sig    hydrocortisone (HYCORT) 1 % ointment Apply  to affected area two (2) times a day. use thin layer (Patient not taking: Reported on 2022)     No current facility-administered medications for this visit. Objective:  Physical Examination:  GENERAL:     General Appearance: well-appearing, well-developed, no acute distress. Hygiene: unremarkable. Mental Status:  alert and oriented. Mood/Affect:  pleasant. Speech: unremarkable. GENITOURINARY - FEMALE:     Vagina:  pink/moist mucosa, no gross evidence of lesions,. Cervix: downward, normal appearing, no lesions/discharge/bleeding: no cervical movement tenderness. Assessment:  The encounter diagnosis was Pap smear of cervix with ASCUS, cannot exclude HGSIL. Plan:  Pap smear results reviewed with pt. Colposcopy done today.   Discussed importance of strict followup and to avoid smoking: Depending on severity, followup may be every 4-6 months or sooner if higher grade and may need immediate treatment. Goal is to prevent progression to Cervical Cancer. Discussed immunes system boosters such as adequate sleep; daily multivitamins, diet rich in wholesome nutritional foods and antioxidants and safe sex practices. Patient expressed understanding; All questions answered. Procedures:  Colposcopy:  Informed consent Risk, complications, benefits and alternatives discussed with patient, Consent obtained, 30 second time out taken. Pregnancy status negative. Negative Pregnancy Test.  Colposcopy procedure Acetic Acid 4-6% solution applied to cervix. Biopsy not taken. Post Colposcopy Patient tolerated the procedure well, Stressed importance of strict followup. Orders Placed This Encounter    COLPOSCOPY,CERVIX W/ADJ VAGINA       Follow Up: Follow-up and Dispositions    · Return if symptoms worsen or fail to improve. A follow-up Pap smear in 1 year is recommended.

## 2023-02-02 ENCOUNTER — HOSPITAL ENCOUNTER (EMERGENCY)
Age: 53
Discharge: HOME HEALTH | End: 2023-02-02
Attending: EMERGENCY MEDICINE
Payer: COMMERCIAL

## 2023-02-02 VITALS
RESPIRATION RATE: 18 BRPM | HEIGHT: 66 IN | DIASTOLIC BLOOD PRESSURE: 80 MMHG | WEIGHT: 168 LBS | BODY MASS INDEX: 27 KG/M2 | HEART RATE: 60 BPM | SYSTOLIC BLOOD PRESSURE: 125 MMHG | OXYGEN SATURATION: 99 % | TEMPERATURE: 97.8 F

## 2023-02-02 DIAGNOSIS — F41.1 ANXIETY STATE: Primary | ICD-10-CM

## 2023-02-02 DIAGNOSIS — R07.9 CHEST PAIN, UNSPECIFIED TYPE: ICD-10-CM

## 2023-02-02 LAB
ATRIAL RATE: 56 BPM
CALCULATED P AXIS, ECG09: 61 DEGREES
CALCULATED R AXIS, ECG10: 41 DEGREES
CALCULATED T AXIS, ECG11: 55 DEGREES
DIAGNOSIS, 93000: NORMAL
P-R INTERVAL, ECG05: 174 MS
Q-T INTERVAL, ECG07: 412 MS
QRS DURATION, ECG06: 80 MS
QTC CALCULATION (BEZET), ECG08: 397 MS
VENTRICULAR RATE, ECG03: 56 BPM

## 2023-02-02 PROCEDURE — 93005 ELECTROCARDIOGRAM TRACING: CPT

## 2023-02-02 PROCEDURE — 99283 EMERGENCY DEPT VISIT LOW MDM: CPT

## 2023-02-02 PROCEDURE — 74011250637 HC RX REV CODE- 250/637: Performed by: NURSE PRACTITIONER

## 2023-02-02 RX ORDER — HYDROXYZINE PAMOATE 50 MG/1
50 CAPSULE ORAL
Qty: 90 CAPSULE | Refills: 0 | Status: SHIPPED | OUTPATIENT
Start: 2023-02-02 | End: 2023-02-16

## 2023-02-02 RX ORDER — HYDROXYZINE PAMOATE 50 MG/1
50 CAPSULE ORAL
Status: DISCONTINUED | OUTPATIENT
Start: 2023-02-02 | End: 2023-02-02 | Stop reason: HOSPADM

## 2023-02-02 RX ADMIN — HYDROXYZINE PAMOATE 50 MG: 50 CAPSULE ORAL at 10:21

## 2023-02-02 NOTE — ED PROVIDER NOTES
[facility] EMERGENCY DEPT  EMERGENCY DEPARTMENT HISTORY AND PHYSICAL EXAM      Date: 2/2/2023  Patient Name: Jason Holley  MRN: [id number]  YOB: 1970  Date of evaluation: 2/2/2023  Provider: Stevie Rasmussen NP   Note Started: 9:51 AM 2/2/23    HISTORY OF PRESENT ILLNESS     Chief Complaint   Patient presents with    Chest Pain    Anxiety       History Provided By: Patient    HPI: Jason Holley, 47 y.o. female with a history of Anxiety presented to the ED complaining of chest pain. Patient denies chest pain but state she feels more substernal discomfort for the past 3 days. No nausea, diaphoresis, dizziness, palpitations or shortness of breath. Patient endorses increased stress at home. Normal cardiac workup within the past 6 months. PAST MEDICAL HISTORY   Past Medical History:  Past Medical History:   Diagnosis Date    Anxiety        Past Surgical History:  Past Surgical History:   Procedure Laterality Date    HX APPENDECTOMY      HX TUBAL LIGATION         Family History:  Family History   Problem Relation Age of Onset    Breast Cancer Maternal Aunt     Lung Cancer Maternal Uncle        Social History:  Social History     Tobacco Use    Smoking status: Never    Smokeless tobacco: Never   Substance Use Topics    Alcohol use: Yes     Comment: occasional    Drug use: Not Currently       Allergies: Allergies   Allergen Reactions    Paxil [Paroxetine Hcl] Unable to Obtain       PCP: Adriana Mosley NP    Current Meds:   Previous Medications    HYDROCORTISONE (HYCORT) 1 % OINTMENT    Apply  to affected area two (2) times a day. use thin layer       REVIEW OF SYSTEMS   Review of Systems  Positives and Pertinent negatives as per HPI.     PHYSICAL EXAM     ED Triage Vitals [02/02/23 0924]   ED Encounter Vitals Group      /83      Pulse (Heart Rate) (!) 58      Resp Rate 16      Temp 97.8 °F (36.6 °C)      Temp src       O2 Sat (%) 100 %      Weight 168 lb      Height 5' 6\"      Physical Exam    SCREENINGS               No data recorded        LAB, EKG AND DIAGNOSTIC RESULTS   Labs:  No results found for this or any previous visit (from the past 12 hour(s)). EKG: Initial EKG interpreted by me. Shows ST/SA;HR 56;, Qtc 397, no acute ST changes. Radiologic Studies:  Non-plain film images such as CT, Ultrasound and MRI are read by the radiologist. Plain radiographic images are visualized and preliminarily interpreted by the ED Provider with the below findings:        Interpretation per the Radiologist below, if available at the time of this note:  No results found. ED COURSE and DIFFERENTIAL DIAGNOSIS/MDM   Vitals:    Vitals:    02/02/23 0924   BP: 129/83   Pulse: (!) 58   Resp: 16   Temp: 97.8 °F (36.6 °C)   SpO2: 100%   Weight: 76.2 kg (168 lb)   Height: 5' 6\" (1.676 m)        Patient was given the following medications:  Medications   hydrOXYzine pamoate (VISTARIL) capsule 50 mg (50 mg Oral Given 2/2/23 1021)       CONSULTS: (Who and What was discussed)  None     Chronic Conditions: Anxiety  Social Determinants affecting Dx or Tx: None  Counseling:      Records Reviewed (source and summary of external notes): Prior medical records    CC/HPI Summary, DDx, ED Course, and Reassessment: Patient presented to the ED complaining of chest discomfort. She does not appear to be in acute distress. Patient is likely experiencing an acute anxiety state and unlikely acute cardiac event. EKG showed no acute ischemic changes and recent cardiac workup negative. Will discharge patient with vistaril 50 mg TID and recommend follow-up with primary care physician for further management. Disposition Considerations (Tests not done, Shared Decision Making, Pt Expectation of Test or Treatment.): Please return to  ED if symptoms worsen or persist.      FINAL IMPRESSION     1. Anxiety state    2.  Chest pain, unspecified type          DISPOSITION/PLAN   Discharged    Discharge Note: The patient is stable for discharge home. The signs, symptoms, diagnosis, and discharge instructions have been discussed, understanding conveyed, and agreed upon. The patient is to follow up as recommended or return to ER should their symptoms worsen. PATIENT REFERRED TO:  Follow-up Information       Follow up With Specialties Details Why Contact Info    Iván Dash NP Nurse Practitioner  If symptoms worsen [address] [facility]  [place] [employer] [address] [phone]      [facility] EMERGENCY DEPT Emergency Medicine  If symptoms worsen [address] [facility] [place] [id number] [phone]              DISCHARGE MEDICATIONS:  Current Discharge Medication List        START taking these medications    Details   hydrOXYzine pamoate (VistariL) 50 mg capsule Take 1 Capsule by mouth three (3) times daily as needed for Anxiety for up to 14 days. Qty: 90 Capsule, Refills: 0  Start date: 2/2/2023, End date: 2/16/2023               DISCONTINUED MEDICATIONS:  Current Discharge Medication List          I am the Primary Clinician of Record: Uri Umanzor NP (electronically signed)    (Please note that parts of this dictation were completed with voice recognition software. Quite often unanticipated grammatical, syntax, homophones, and other interpretive errors are inadvertently transcribed by the computer software. Please disregards these errors.  Please excuse any errors that have escaped final proofreading.)

## 2023-02-02 NOTE — Clinical Note
[facility] EMERGENCY DEPT  [address] [place] [id number]  [phone]    Work/School Note    Date: 2/2/2023    To Whom It May concern:    Emanuel Aldana was seen and treated today in the emergency room by the following provider(s):  Attending Provider: Bertina Bence, MD  Nurse Practitioner: Quentin Ayers NP. Emanuel Aldana is excused from work/school on 2/2/2023 through 2/4/2023. She is medically clear to return to work/school on 2/5/2023.          Sincerely,          Mojgan Bartlett RN

## 2023-02-02 NOTE — Clinical Note
[facility] EMERGENCY DEPT  [address] [place] [id number]  [phone]    Work/School Note    Date: 2/2/2023    To Whom It May concern:    Humberto Puente was seen and treated today in the emergency room by the following provider(s):  Attending Provider: Aramis Ramirez MD  Nurse Practitioner: Massiel Menezes NP. Humberto Puente is excused from work/school on 2/2/2023 through 2/4/2023. She is medically clear to return to work/school on 2/5/2023.          Sincerely,          Lizbet Alfredo NP

## 2023-02-02 NOTE — ED TRIAGE NOTES
Patient complains of having a funny feeling coming across her chest. Denies pain, tightness or pressure. States she also feels heart racing.

## 2023-07-19 ENCOUNTER — HOSPITAL ENCOUNTER (EMERGENCY)
Facility: HOSPITAL | Age: 53
Discharge: HOME | End: 2023-07-19
Attending: STUDENT IN AN ORGANIZED HEALTH CARE EDUCATION/TRAINING PROGRAM
Payer: COMMERCIAL

## 2023-07-19 VITALS
HEART RATE: 69 BPM | TEMPERATURE: 98.4 F | HEIGHT: 66 IN | OXYGEN SATURATION: 98 % | RESPIRATION RATE: 18 BRPM | BODY MASS INDEX: 28.61 KG/M2 | WEIGHT: 178 LBS | SYSTOLIC BLOOD PRESSURE: 145 MMHG | DIASTOLIC BLOOD PRESSURE: 93 MMHG

## 2023-07-19 DIAGNOSIS — S46.811A STRAIN OF RIGHT TRAPEZIUS MUSCLE, INITIAL ENCOUNTER: Primary | ICD-10-CM

## 2023-07-19 DIAGNOSIS — R03.0 ELEVATED BLOOD PRESSURE READING: ICD-10-CM

## 2023-07-19 PROCEDURE — 99284 EMERGENCY DEPT VISIT MOD MDM: CPT

## 2023-07-19 PROCEDURE — 96372 THER/PROPH/DIAG INJ SC/IM: CPT

## 2023-07-19 PROCEDURE — 6360000002 HC RX W HCPCS: Performed by: STUDENT IN AN ORGANIZED HEALTH CARE EDUCATION/TRAINING PROGRAM

## 2023-07-19 PROCEDURE — 6370000000 HC RX 637 (ALT 250 FOR IP): Performed by: STUDENT IN AN ORGANIZED HEALTH CARE EDUCATION/TRAINING PROGRAM

## 2023-07-19 RX ORDER — METHOCARBAMOL 500 MG/1
500 TABLET, FILM COATED ORAL 4 TIMES DAILY PRN
Qty: 20 TABLET | Refills: 0 | Status: SHIPPED | OUTPATIENT
Start: 2023-07-19 | End: 2023-07-24

## 2023-07-19 RX ORDER — KETOROLAC TROMETHAMINE 30 MG/ML
30 INJECTION, SOLUTION INTRAMUSCULAR; INTRAVENOUS
Status: COMPLETED | OUTPATIENT
Start: 2023-07-19 | End: 2023-07-19

## 2023-07-19 RX ORDER — ACETAMINOPHEN 500 MG
1000 TABLET ORAL EVERY 6 HOURS PRN
Qty: 40 TABLET | Refills: 0 | Status: SHIPPED | OUTPATIENT
Start: 2023-07-19

## 2023-07-19 RX ORDER — METHOCARBAMOL 750 MG/1
1500 TABLET, FILM COATED ORAL ONCE
Status: COMPLETED | OUTPATIENT
Start: 2023-07-19 | End: 2023-07-19

## 2023-07-19 RX ORDER — KETOROLAC TROMETHAMINE 10 MG/1
10 TABLET, FILM COATED ORAL EVERY 6 HOURS PRN
Qty: 20 TABLET | Refills: 0 | Status: SHIPPED | OUTPATIENT
Start: 2023-07-19

## 2023-07-19 RX ADMIN — KETOROLAC TROMETHAMINE 30 MG: 30 INJECTION, SOLUTION INTRAMUSCULAR; INTRAVENOUS at 06:24

## 2023-07-19 RX ADMIN — METHOCARBAMOL 1500 MG: 750 TABLET ORAL at 06:23

## 2023-07-19 ASSESSMENT — PAIN SCALES - GENERAL
PAINLEVEL_OUTOF10: 7

## 2023-07-19 ASSESSMENT — PAIN DESCRIPTION - ORIENTATION
ORIENTATION: RIGHT
ORIENTATION: RIGHT

## 2023-07-19 ASSESSMENT — PAIN DESCRIPTION - LOCATION
LOCATION: ARM;BACK
LOCATION: ARM;BACK

## 2023-07-19 ASSESSMENT — LIFESTYLE VARIABLES
HOW OFTEN DO YOU HAVE A DRINK CONTAINING ALCOHOL: MONTHLY OR LESS
HOW MANY STANDARD DRINKS CONTAINING ALCOHOL DO YOU HAVE ON A TYPICAL DAY: 1 OR 2

## 2023-07-19 ASSESSMENT — PAIN - FUNCTIONAL ASSESSMENT: PAIN_FUNCTIONAL_ASSESSMENT: 0-10

## 2023-07-19 NOTE — ED PROVIDER NOTES
tablet           DISCONTINUED MEDICATIONS:  Discharge Medication List as of 7/19/2023  6:19 AM          I am the Primary Clinician of Record. Ayden Marrero MD (electronically signed)    (Please note that parts of this dictation were completed with voice recognition software. Quite often unanticipated grammatical, syntax, homophones, and other interpretive errors are inadvertently transcribed by the computer software. Please disregards these errors.  Please excuse any errors that have escaped final proofreading.)     Ayden Marrero MD  07/19/23 5325

## 2023-07-20 ENCOUNTER — HOSPITAL ENCOUNTER (EMERGENCY)
Facility: HOSPITAL | Age: 53
Discharge: HOME | End: 2023-07-20
Attending: EMERGENCY MEDICINE
Payer: COMMERCIAL

## 2023-07-20 VITALS
WEIGHT: 175 LBS | HEART RATE: 91 BPM | OXYGEN SATURATION: 100 % | RESPIRATION RATE: 17 BRPM | DIASTOLIC BLOOD PRESSURE: 92 MMHG | TEMPERATURE: 98.4 F | HEIGHT: 66 IN | BODY MASS INDEX: 28.12 KG/M2 | SYSTOLIC BLOOD PRESSURE: 177 MMHG

## 2023-07-20 DIAGNOSIS — B02.9 HERPES ZOSTER WITHOUT COMPLICATION: Primary | ICD-10-CM

## 2023-07-20 DIAGNOSIS — R03.0 ELEVATED BLOOD PRESSURE READING: ICD-10-CM

## 2023-07-20 PROCEDURE — 6370000000 HC RX 637 (ALT 250 FOR IP): Performed by: EMERGENCY MEDICINE

## 2023-07-20 PROCEDURE — 99283 EMERGENCY DEPT VISIT LOW MDM: CPT

## 2023-07-20 RX ORDER — PREDNISONE 20 MG/1
60 TABLET ORAL
Status: COMPLETED | OUTPATIENT
Start: 2023-07-20 | End: 2023-07-20

## 2023-07-20 RX ORDER — PREDNISONE 20 MG/1
TABLET ORAL
Qty: 12 TABLET | Refills: 1 | Status: SHIPPED | OUTPATIENT
Start: 2023-07-20

## 2023-07-20 RX ORDER — ACYCLOVIR 800 MG/1
800 TABLET ORAL 4 TIMES DAILY
Qty: 40 TABLET | Refills: 0 | Status: SHIPPED | OUTPATIENT
Start: 2023-07-20 | End: 2023-07-30

## 2023-07-20 RX ORDER — IBUPROFEN 800 MG/1
800 TABLET ORAL
Status: COMPLETED | OUTPATIENT
Start: 2023-07-20 | End: 2023-07-20

## 2023-07-20 RX ORDER — ACYCLOVIR 800 MG/1
800 TABLET ORAL
Status: COMPLETED | OUTPATIENT
Start: 2023-07-20 | End: 2023-07-20

## 2023-07-20 RX ORDER — ACETAMINOPHEN 500 MG
1000 TABLET ORAL
Status: COMPLETED | OUTPATIENT
Start: 2023-07-20 | End: 2023-07-20

## 2023-07-20 RX ORDER — IBUPROFEN 800 MG/1
800 TABLET ORAL
Qty: 20 TABLET | Refills: 0 | Status: SHIPPED | OUTPATIENT
Start: 2023-07-20

## 2023-07-20 RX ADMIN — ACETAMINOPHEN 1000 MG: 500 TABLET ORAL at 21:11

## 2023-07-20 RX ADMIN — IBUPROFEN 800 MG: 800 TABLET, FILM COATED ORAL at 21:11

## 2023-07-20 RX ADMIN — ACYCLOVIR 800 MG: 800 TABLET ORAL at 21:11

## 2023-07-20 RX ADMIN — PREDNISONE 60 MG: 20 TABLET ORAL at 21:11

## 2023-07-20 ASSESSMENT — LIFESTYLE VARIABLES
HOW MANY STANDARD DRINKS CONTAINING ALCOHOL DO YOU HAVE ON A TYPICAL DAY: 1 OR 2
HOW OFTEN DO YOU HAVE A DRINK CONTAINING ALCOHOL: MONTHLY OR LESS

## 2023-07-20 ASSESSMENT — PAIN - FUNCTIONAL ASSESSMENT: PAIN_FUNCTIONAL_ASSESSMENT: 0-10

## 2023-07-20 ASSESSMENT — PAIN SCALES - GENERAL: PAINLEVEL_OUTOF10: 7

## 2023-07-21 NOTE — ED PROVIDER NOTES
EMERGENCY DEPARTMENT HISTORY AND PHYSICAL EXAM    Date: 7/20/2023  Patient Name: rBent Mclaughlin    History of Presenting Illness     Chief Complaint   Patient presents with    Back Pain    Rash       History Provided By: Patient    HPI: Brent Mclaughlin, 46 y.o. female   presents to the ED with cc of rash. Patient complains of rash on her right-sided chest and right upper back for last 2 days. Patient also complains of moderate degree of pain at the site of rash that has been constant since the onset. No obvious aggravating alleviating factors. Patient was seen at ER yesterday and was diagnosed as muscular strain. Patient was prescribed of muscle relaxant and did not offer any improvement as per patient. No injury. No URI symptoms. No headache or neck pain. No chest pain or shortness of breath. No abdominal pain. No vomiting or diarrhea. Patient had a outpatient routine blood work by her primary and has an appointment to see the primary next week. Patient states that she was told that her blood pressure is elevated but \"stable\". Patient not currently on antihypertensive. PCP: BENITO Gillespie NP    No current facility-administered medications on file prior to encounter.      Current Outpatient Medications on File Prior to Encounter   Medication Sig Dispense Refill    methocarbamol (ROBAXIN) 500 MG tablet Take 1 tablet by mouth 4 times daily as needed (Muscle spasms) 20 tablet 0    acetaminophen (TYLENOL) 500 MG tablet Take 2 tablets by mouth every 6 hours as needed for Pain 40 tablet 0    ketorolac (TORADOL) 10 MG tablet Take 1 tablet by mouth every 6 hours as needed for Pain 20 tablet 0    hydrocortisone 1 % ointment Apply topically 2 times daily         Past History     Past Medical History:  Past Medical History:   Diagnosis Date    Anxiety        Past Surgical History:  Past Surgical History:   Procedure Laterality Date    APPENDECTOMY      TUBAL LIGATION         Family

## 2023-07-21 NOTE — ED TRIAGE NOTES
Pt reports back pain and a red rash that is on her right breast and wrapping around to the back. Pt reports she was seen yesterday for the back pain, but the rash got worse today.

## 2024-04-19 ENCOUNTER — HOSPITAL ENCOUNTER (EMERGENCY)
Facility: HOSPITAL | Age: 54
Discharge: HOME | End: 2024-04-19
Attending: EMERGENCY MEDICINE
Payer: COMMERCIAL

## 2024-04-19 VITALS
TEMPERATURE: 99.5 F | HEIGHT: 65 IN | OXYGEN SATURATION: 97 % | HEART RATE: 97 BPM | SYSTOLIC BLOOD PRESSURE: 148 MMHG | DIASTOLIC BLOOD PRESSURE: 81 MMHG | RESPIRATION RATE: 20 BRPM | BODY MASS INDEX: 29.66 KG/M2 | WEIGHT: 178 LBS

## 2024-04-19 DIAGNOSIS — J30.2 SEASONAL ALLERGIC RHINITIS, UNSPECIFIED TRIGGER: Primary | ICD-10-CM

## 2024-04-19 PROCEDURE — 99283 EMERGENCY DEPT VISIT LOW MDM: CPT

## 2024-04-19 PROCEDURE — 6370000000 HC RX 637 (ALT 250 FOR IP): Performed by: EMERGENCY MEDICINE

## 2024-04-19 RX ORDER — LORATADINE 10 MG/1
10 TABLET ORAL DAILY
Qty: 30 TABLET | Refills: 0 | Status: SHIPPED | OUTPATIENT
Start: 2024-04-19 | End: 2024-04-19

## 2024-04-19 RX ORDER — PREDNISONE 20 MG/1
TABLET ORAL
Qty: 12 TABLET | Refills: 1 | Status: SHIPPED | OUTPATIENT
Start: 2024-04-19

## 2024-04-19 RX ORDER — FLUTICASONE PROPIONATE 50 MCG
2 SPRAY, SUSPENSION (ML) NASAL DAILY
Qty: 16 G | Refills: 0 | Status: SHIPPED | OUTPATIENT
Start: 2024-04-19 | End: 2024-04-19

## 2024-04-19 RX ORDER — LORATADINE 10 MG/1
10 TABLET ORAL DAILY
Qty: 30 TABLET | Refills: 0 | Status: SHIPPED | OUTPATIENT
Start: 2024-04-19 | End: 2024-05-19

## 2024-04-19 RX ORDER — PREDNISONE 20 MG/1
60 TABLET ORAL
Status: COMPLETED | OUTPATIENT
Start: 2024-04-19 | End: 2024-04-19

## 2024-04-19 RX ORDER — PREDNISONE 20 MG/1
TABLET ORAL
Qty: 12 TABLET | Refills: 1 | Status: SHIPPED | OUTPATIENT
Start: 2024-04-19 | End: 2024-04-19

## 2024-04-19 RX ORDER — FLUTICASONE PROPIONATE 50 MCG
2 SPRAY, SUSPENSION (ML) NASAL DAILY
Qty: 16 G | Refills: 0 | Status: SHIPPED | OUTPATIENT
Start: 2024-04-19

## 2024-04-19 RX ADMIN — PREDNISONE 60 MG: 20 TABLET ORAL at 20:08

## 2024-04-19 ASSESSMENT — PAIN DESCRIPTION - LOCATION: LOCATION: THROAT

## 2024-04-19 ASSESSMENT — PAIN SCALES - GENERAL: PAINLEVEL_OUTOF10: 8

## 2024-04-19 ASSESSMENT — PAIN - FUNCTIONAL ASSESSMENT: PAIN_FUNCTIONAL_ASSESSMENT: 0-10

## 2024-04-20 NOTE — ED PROVIDER NOTES
[facility] EMERGENCY DEPT  EMERGENCY DEPARTMENT HISTORY AND PHYSICAL EXAM      Date: 4/19/2024  Patient Name: Bg Zayas  MRN: [id number]  Birthdate 1970  Date of evaluation: 4/19/2024  Provider: Steph Kyle MD  Note Started: 11:22 PM EDT 4/19/24    HISTORY OF PRESENT ILLNESS     Chief Complaint   Patient presents with    Pharyngitis    Ear Fullness       History Provided By: Patient    HPI: Bg Zayas is a 54 y.o. female.  Patient presents with a complaint of nasal congestion with postnasal drip and mild sore throat and ear fullness for the last several days.  Patient states that she had liposuction of her chin procedure 2 days prior to the onset of symptoms.  No headache or body ache.  No fever or chills.  Occasional nonproductive cough.  No vomiting or diarrhea        PAST MEDICAL HISTORY   Past Medical History:  Past Medical History:   Diagnosis Date    Anxiety        Past Surgical History:  Past Surgical History:   Procedure Laterality Date    APPENDECTOMY      TUBAL LIGATION         Family History:  Family History   Problem Relation Age of Onset    Breast Cancer Maternal Aunt     Lung Cancer Maternal Uncle        Social History:  Social History     Tobacco Use    Smoking status: Never    Smokeless tobacco: Never   Substance Use Topics    Alcohol use: Yes    Drug use: Not Currently       Allergies:  Allergies   Allergen Reactions    Morphine Other (See Comments)     Increases pain sensitivity per the pt.    Paroxetine Hcl      Other reaction(s): Unable to Obtain       PCP: Valarie Li APRN - NP    Current Meds:   No current facility-administered medications for this encounter.     Current Outpatient Medications   Medication Sig Dispense Refill    fluticasone (FLONASE) 50 MCG/ACT nasal spray 2 sprays by Each Nostril route daily 16 g 0    loratadine (CLARITIN) 10 MG tablet Take 1 tablet by mouth daily 30 tablet 0    predniSONE (DELTASONE) 20 MG tablet Take 3 tablets once a day for 2  medication(s) that are the same as other medications prescribed for you. Read the directions carefully, and ask your doctor or other care provider to review them with you.                ASK your doctor about these medications      acetaminophen 500 MG tablet  Commonly known as: TYLENOL  Take 2 tablets by mouth every 6 hours as needed for Pain     hydrocortisone 1 % ointment     ibuprofen 800 MG tablet  Commonly known as: ADVIL;MOTRIN  Take 1 tablet by mouth 3 times daily (with meals)     ketorolac 10 MG tablet  Commonly known as: TORADOL  Take 1 tablet by mouth every 6 hours as needed for Pain               Where to Get Your Medications        These medications were sent to [id number] - [place], [place] - [address] - P [phone] - F [phone] [address] [facility] [address]      Phone: [phone]   fluticasone 50 MCG/ACT nasal spray  loratadine 10 MG tablet  predniSONE 20 MG tablet           DISCONTINUED MEDICATIONS:  Discharge Medication List as of 4/19/2024  8:36 PM          I am the Primary Clinician of Record. Steph erickson MD (electronically signed)    (Please note that parts of this dictation were completed with voice recognition software. Quite often unanticipated grammatical, syntax, homophones, and other interpretive errors are inadvertently transcribed by the computer software. Please disregards these errors. Please excuse any errors that have escaped final proofreading.)     Steph Erickson MD  04/19/24 7320

## 2024-04-20 NOTE — ED TRIAGE NOTES
Pt. Presents with sore throat, had liposuction to her chin on 4/12. Pt. Reporting sore throat since then, states her swelling has gone down but is also c/o of ear fullness bilaterally. Pt. Denies contact with ill persons. VSS

## 2024-07-19 ENCOUNTER — HOSPITAL ENCOUNTER (EMERGENCY)
Facility: HOSPITAL | Age: 54
Discharge: HOME | End: 2024-07-19
Payer: COMMERCIAL

## 2024-07-19 ENCOUNTER — APPOINTMENT (OUTPATIENT)
Facility: HOSPITAL | Age: 54
End: 2024-07-19
Payer: COMMERCIAL

## 2024-07-19 VITALS
HEIGHT: 65 IN | BODY MASS INDEX: 29.66 KG/M2 | DIASTOLIC BLOOD PRESSURE: 90 MMHG | RESPIRATION RATE: 16 BRPM | TEMPERATURE: 98.1 F | SYSTOLIC BLOOD PRESSURE: 153 MMHG | WEIGHT: 178 LBS | HEART RATE: 77 BPM | OXYGEN SATURATION: 100 %

## 2024-07-19 DIAGNOSIS — R10.11 RIGHT UPPER QUADRANT ABDOMINAL PAIN: Primary | ICD-10-CM

## 2024-07-19 DIAGNOSIS — K76.0 HEPATIC STEATOSIS: ICD-10-CM

## 2024-07-19 LAB
ALBUMIN SERPL-MCNC: 3.9 G/DL (ref 3.5–5)
ALBUMIN/GLOB SERPL: 0.9 (ref 1.1–2.2)
ALP SERPL-CCNC: 75 U/L (ref 45–117)
ALT SERPL-CCNC: 34 U/L (ref 12–78)
AMORPH CRY URNS QL MICRO: ABNORMAL
ANION GAP SERPL CALC-SCNC: 7 MMOL/L (ref 5–15)
APPEARANCE UR: CLEAR
AST SERPL W P-5'-P-CCNC: 34 U/L (ref 15–37)
BACTERIA URNS QL MICRO: NEGATIVE /HPF
BASOPHILS # BLD: 0.1 K/UL (ref 0–0.1)
BASOPHILS NFR BLD: 1 % (ref 0–1)
BILIRUB SERPL-MCNC: 0.4 MG/DL (ref 0.2–1)
BILIRUB UR QL: NEGATIVE
BUN SERPL-MCNC: 15 MG/DL (ref 6–20)
BUN/CREAT SERPL: 15 (ref 12–20)
CA-I BLD-MCNC: 9.3 MG/DL (ref 8.5–10.1)
CHLORIDE SERPL-SCNC: 104 MMOL/L (ref 97–108)
CO2 SERPL-SCNC: 29 MMOL/L (ref 21–32)
COLOR UR: YELLOW
CREAT SERPL-MCNC: 0.99 MG/DL (ref 0.55–1.02)
DIFFERENTIAL METHOD BLD: ABNORMAL
EOSINOPHIL # BLD: 0.1 K/UL (ref 0–0.4)
EOSINOPHIL NFR BLD: 1 % (ref 0–7)
EPITH CASTS URNS QL MICRO: ABNORMAL /LPF
ERYTHROCYTE [DISTWIDTH] IN BLOOD BY AUTOMATED COUNT: 11.9 % (ref 11.5–14.5)
GLOBULIN SER CALC-MCNC: 4.3 G/DL (ref 2–4)
GLUCOSE SERPL-MCNC: 114 MG/DL (ref 65–100)
GLUCOSE UR STRIP.AUTO-MCNC: NEGATIVE MG/DL
HCT VFR BLD AUTO: 45.7 % (ref 35–47)
HGB BLD-MCNC: 14.8 G/DL (ref 11.5–16)
HGB UR QL STRIP: NEGATIVE
IMM GRANULOCYTES # BLD AUTO: 0 K/UL (ref 0–0.04)
IMM GRANULOCYTES NFR BLD AUTO: 0 % (ref 0–0.5)
KETONES UR QL STRIP.AUTO: NEGATIVE MG/DL
LEUKOCYTE ESTERASE UR QL STRIP.AUTO: NEGATIVE
LIPASE SERPL-CCNC: 40 U/L (ref 13–75)
LYMPHOCYTES # BLD: 3.6 K/UL (ref 0.8–3.5)
LYMPHOCYTES NFR BLD: 54 % (ref 12–49)
MCH RBC QN AUTO: 30.2 PG (ref 26–34)
MCHC RBC AUTO-ENTMCNC: 32.4 G/DL (ref 30–36.5)
MCV RBC AUTO: 93.3 FL (ref 80–99)
MONOCYTES # BLD: 0.5 K/UL (ref 0–1)
MONOCYTES NFR BLD: 8 % (ref 5–13)
NEUTS SEG # BLD: 2.4 K/UL (ref 1.8–8)
NEUTS SEG NFR BLD: 36 % (ref 32–75)
NITRITE UR QL STRIP.AUTO: NEGATIVE
PH UR STRIP: 6 (ref 5–8)
PLATELET # BLD AUTO: 291 K/UL (ref 150–400)
PMV BLD AUTO: 9 FL (ref 8.9–12.9)
POTASSIUM SERPL-SCNC: 4.8 MMOL/L (ref 3.5–5.1)
PROT SERPL-MCNC: 8.2 G/DL (ref 6.4–8.2)
PROT UR STRIP-MCNC: NEGATIVE MG/DL
RBC # BLD AUTO: 4.9 M/UL (ref 3.8–5.2)
RBC #/AREA URNS HPF: ABNORMAL /HPF (ref 0–5)
SODIUM SERPL-SCNC: 140 MMOL/L (ref 136–145)
SP GR UR REFRACTOMETRY: 1 (ref 1–1.03)
URINE CULTURE IF INDICATED: ABNORMAL
UROBILINOGEN UR QL STRIP.AUTO: 0.1 EU/DL (ref 0.2–1)
WBC # BLD AUTO: 6.7 K/UL (ref 3.6–11)
WBC URNS QL MICRO: ABNORMAL /HPF (ref 0–4)

## 2024-07-19 PROCEDURE — 81001 URINALYSIS AUTO W/SCOPE: CPT

## 2024-07-19 PROCEDURE — 99284 EMERGENCY DEPT VISIT MOD MDM: CPT

## 2024-07-19 PROCEDURE — 85025 COMPLETE CBC W/AUTO DIFF WBC: CPT

## 2024-07-19 PROCEDURE — 83690 ASSAY OF LIPASE: CPT

## 2024-07-19 PROCEDURE — 76705 ECHO EXAM OF ABDOMEN: CPT

## 2024-07-19 PROCEDURE — 2580000003 HC RX 258

## 2024-07-19 PROCEDURE — 80053 COMPREHEN METABOLIC PANEL: CPT

## 2024-07-19 RX ORDER — 0.9 % SODIUM CHLORIDE 0.9 %
500 INTRAVENOUS SOLUTION INTRAVENOUS ONCE
Status: COMPLETED | OUTPATIENT
Start: 2024-07-19 | End: 2024-07-19

## 2024-07-19 RX ADMIN — SODIUM CHLORIDE 500 ML: 9 INJECTION, SOLUTION INTRAVENOUS at 10:53

## 2024-07-19 ASSESSMENT — PAIN SCALES - GENERAL: PAINLEVEL_OUTOF10: 0

## 2024-07-19 ASSESSMENT — PAIN - FUNCTIONAL ASSESSMENT: PAIN_FUNCTIONAL_ASSESSMENT: 0-10

## 2024-07-19 NOTE — DISCHARGE INSTRUCTIONS
please return to us. We are available 24 hours a day.     If a prescription has been provided, please fill it as soon as possible to prevent a delay in treatment. If you have any questions or reservations about taking the medication due to side effects or interactions with other medications, please call your primary care provider or contact us directly.  Again, THANK YOU for choosing us to care for YOU!

## 2024-07-19 NOTE — ED TRIAGE NOTES
Pt to er with right sided abd pain x 1 week with jeff colored stools x 2 days . Pain increases after eating

## 2024-11-26 RX ORDER — ATORVASTATIN CALCIUM 10 MG/1
10 TABLET, FILM COATED ORAL DAILY
COMMUNITY

## 2024-11-26 NOTE — PRE-PROCEDURE INSTRUCTIONS
Called patient at [phone]. Reviewed detailed pre-procedure instructions. Stated understanding and that she is not a hard IV stick. Her mother or daughter will be her ride home. Arrival time of 0830 given.

## 2024-12-04 ENCOUNTER — ANESTHESIA EVENT (OUTPATIENT)
Facility: HOSPITAL | Age: 54
End: 2024-12-04
Payer: COMMERCIAL

## 2024-12-05 ENCOUNTER — HOSPITAL ENCOUNTER (OUTPATIENT)
Facility: HOSPITAL | Age: 54
Setting detail: OUTPATIENT SURGERY
Discharge: HOME | End: 2024-12-05
Attending: INTERNAL MEDICINE | Admitting: INTERNAL MEDICINE
Payer: COMMERCIAL

## 2024-12-05 ENCOUNTER — ANESTHESIA (OUTPATIENT)
Facility: HOSPITAL | Age: 54
End: 2024-12-05
Payer: COMMERCIAL

## 2024-12-05 VITALS
TEMPERATURE: 97.4 F | RESPIRATION RATE: 18 BRPM | HEART RATE: 86 BPM | WEIGHT: 177 LBS | DIASTOLIC BLOOD PRESSURE: 78 MMHG | OXYGEN SATURATION: 98 % | BODY MASS INDEX: 29.45 KG/M2 | SYSTOLIC BLOOD PRESSURE: 114 MMHG

## 2024-12-05 PROCEDURE — 3700000000 HC ANESTHESIA ATTENDED CARE: Performed by: INTERNAL MEDICINE

## 2024-12-05 PROCEDURE — 7100000010 HC PHASE II RECOVERY - FIRST 15 MIN: Performed by: INTERNAL MEDICINE

## 2024-12-05 PROCEDURE — 2709999900 HC NON-CHARGEABLE SUPPLY: Performed by: INTERNAL MEDICINE

## 2024-12-05 PROCEDURE — 3600007502: Performed by: INTERNAL MEDICINE

## 2024-12-05 PROCEDURE — 6360000002 HC RX W HCPCS: Performed by: NURSE ANESTHETIST, CERTIFIED REGISTERED

## 2024-12-05 PROCEDURE — 7100000011 HC PHASE II RECOVERY - ADDTL 15 MIN: Performed by: INTERNAL MEDICINE

## 2024-12-05 PROCEDURE — 3700000001 HC ADD 15 MINUTES (ANESTHESIA): Performed by: INTERNAL MEDICINE

## 2024-12-05 PROCEDURE — 3600007512: Performed by: INTERNAL MEDICINE

## 2024-12-05 RX ORDER — LIDOCAINE HYDROCHLORIDE 20 MG/ML
INJECTION, SOLUTION EPIDURAL; INFILTRATION; INTRACAUDAL; PERINEURAL
Status: DISCONTINUED | OUTPATIENT
Start: 2024-12-05 | End: 2024-12-05 | Stop reason: SDUPTHER

## 2024-12-05 RX ADMIN — LIDOCAINE HYDROCHLORIDE 100 MG: 20 SOLUTION INTRAVENOUS at 09:37

## 2024-12-05 RX ADMIN — PROPOFOL 100 MG: 10 INJECTION, EMULSION INTRAVENOUS at 09:40

## 2024-12-05 RX ADMIN — PROPOFOL 100 MG: 10 INJECTION, EMULSION INTRAVENOUS at 09:43

## 2024-12-05 RX ADMIN — PROPOFOL 200 MG: 10 INJECTION, EMULSION INTRAVENOUS at 09:37

## 2024-12-05 RX ADMIN — PROPOFOL 100 MG: 10 INJECTION, EMULSION INTRAVENOUS at 09:47

## 2024-12-05 ASSESSMENT — PAIN - FUNCTIONAL ASSESSMENT
PAIN_FUNCTIONAL_ASSESSMENT: NONE - DENIES PAIN

## 2024-12-05 NOTE — ANESTHESIA PRE PROCEDURE
Department of Anesthesiology  Preprocedure Note       Name:  Bg Zayas   Age:  54 y.o.  :  1970                                          MRN:  [id number]         Date:  2024      Surgeon: Surgeon(s):  Arash Garcia MD    Procedure: Procedure(s):  COLONOSCOPY/BIOPSY/POLYPECTOMY    Medications prior to admission:   Prior to Admission medications    Medication Sig Start Date End Date Taking? Authorizing Provider   atorvastatin (LIPITOR) 10 MG tablet Take 1 tablet by mouth daily   Yes Provider, MD Stephy   fluticasone (FLONASE) 50 MCG/ACT nasal spray 2 sprays by Each Nostril route daily 24   Steph Carbajal MD   ibuprofen (ADVIL;MOTRIN) 800 MG tablet Take 1 tablet by mouth 3 times daily (with meals) 23   Steph Carbajal MD   acetaminophen (TYLENOL) 500 MG tablet Take 2 tablets by mouth every 6 hours as needed for Pain 23   Maciej Gottlieb MD   hydrocortisone 1 % ointment Apply topically 2 times daily 10/9/21   Automatic Reconciliation, Ar       Current medications:    No current facility-administered medications for this encounter.     Current Outpatient Medications   Medication Sig Dispense Refill    atorvastatin (LIPITOR) 10 MG tablet Take 1 tablet by mouth daily      fluticasone (FLONASE) 50 MCG/ACT nasal spray 2 sprays by Each Nostril route daily 16 g 0    ibuprofen (ADVIL;MOTRIN) 800 MG tablet Take 1 tablet by mouth 3 times daily (with meals) 20 tablet 0    acetaminophen (TYLENOL) 500 MG tablet Take 2 tablets by mouth every 6 hours as needed for Pain 40 tablet 0    hydrocortisone 1 % ointment Apply topically 2 times daily         Allergies:    Allergies   Allergen Reactions    Morphine Other (See Comments)     Increases pain sensitivity per the pt.    Paroxetine Hcl      Other reaction(s): Unable to Obtain       Problem List:    Patient Active Problem List   Diagnosis Code    Chest pain R07.9    Pap smear of cervix with ASCUS, cannot exclude HGSIL R87.611       Past Medical

## 2024-12-05 NOTE — ANESTHESIA POSTPROCEDURE EVALUATION
Department of Anesthesiology  Postprocedure Note    Patient: Bg Zayas  MRN: [id number]  YOB: 1970  Date of evaluation: 12/5/2024    Procedure Summary       Date: 12/05/24 Room / Location: [facility] ENDO 03 / [facility] ENDOSCOPY    Anesthesia Start: 0936 Anesthesia Stop: 1000    Procedure: COLONOSCOPY/BIOPSY/POLYPECTOMY (Lower GI Region) Diagnosis:       Colon cancer screening      (Colon cancer screening [Z12.11])    Surgeons: Arash Garcia MD Responsible Provider: Jeromy Dowd MD    Anesthesia Type: MAC, TIVA ASA Status: 2            Anesthesia Type: No value filed.    Yenny Phase I: Yenny Score: 10    Yenny Phase II: Yenny Score: 10    Anesthesia Post Evaluation    Patient location during evaluation: bedside (Endoscopy Unit)  Patient participation: complete - patient participated  Level of consciousness: sleepy but conscious  Pain score: 0  Airway patency: patent  Nausea & Vomiting: no nausea and no vomiting  Cardiovascular status: hemodynamically stable  Respiratory status: acceptable  Hydration status: stable  Comments: This patient remained on the stretcher.  The patient was handed off to the endoscopy nursing team.  All questions regarding pre-, intra-, and postoperative care were answered.  Multimodal analgesia pain management approach        No notable events documented.

## 2024-12-05 NOTE — PERIOP NOTE
Discharged via wheelchair no signs or symptoms of distress noted. IV d/romain gauze and bandaid applied. Discharge instructions reviewed without questions, mother in attendance.

## 2025-02-12 ENCOUNTER — HOSPITAL ENCOUNTER (EMERGENCY)
Facility: HOSPITAL | Age: 55
Discharge: HOME | End: 2025-02-12
Attending: STUDENT IN AN ORGANIZED HEALTH CARE EDUCATION/TRAINING PROGRAM
Payer: COMMERCIAL

## 2025-02-12 ENCOUNTER — APPOINTMENT (OUTPATIENT)
Facility: HOSPITAL | Age: 55
End: 2025-02-12
Payer: COMMERCIAL

## 2025-02-12 VITALS
TEMPERATURE: 98.2 F | BODY MASS INDEX: 29.16 KG/M2 | DIASTOLIC BLOOD PRESSURE: 85 MMHG | WEIGHT: 175 LBS | OXYGEN SATURATION: 98 % | RESPIRATION RATE: 15 BRPM | SYSTOLIC BLOOD PRESSURE: 119 MMHG | HEIGHT: 65 IN | HEART RATE: 58 BPM

## 2025-02-12 DIAGNOSIS — R07.89 OTHER CHEST PAIN: Primary | ICD-10-CM

## 2025-02-12 LAB
ALBUMIN SERPL-MCNC: 3.6 G/DL (ref 3.5–5)
ALBUMIN/GLOB SERPL: 1 (ref 1.1–2.2)
ALP SERPL-CCNC: 69 U/L (ref 45–117)
ALT SERPL-CCNC: 33 U/L (ref 12–78)
ANION GAP SERPL CALC-SCNC: 6 MMOL/L (ref 2–12)
AST SERPL W P-5'-P-CCNC: 29 U/L (ref 15–37)
BASOPHILS # BLD: 0.04 K/UL (ref 0–0.1)
BASOPHILS NFR BLD: 0.7 % (ref 0–1)
BILIRUB SERPL-MCNC: 0.4 MG/DL (ref 0.2–1)
BUN SERPL-MCNC: 12 MG/DL (ref 6–20)
BUN/CREAT SERPL: 12 (ref 12–20)
CA-I BLD-MCNC: 9.4 MG/DL (ref 8.5–10.1)
CHLORIDE SERPL-SCNC: 109 MMOL/L (ref 97–108)
CO2 SERPL-SCNC: 26 MMOL/L (ref 21–32)
CREAT SERPL-MCNC: 0.97 MG/DL (ref 0.55–1.02)
DIFFERENTIAL METHOD BLD: NORMAL
EOSINOPHIL # BLD: 0.08 K/UL (ref 0–0.4)
EOSINOPHIL NFR BLD: 1.3 % (ref 0–7)
ERYTHROCYTE [DISTWIDTH] IN BLOOD BY AUTOMATED COUNT: 12.2 % (ref 11.5–14.5)
GLOBULIN SER CALC-MCNC: 3.6 G/DL (ref 2–4)
GLUCOSE SERPL-MCNC: 121 MG/DL (ref 65–100)
HCT VFR BLD AUTO: 41.5 % (ref 35–47)
HGB BLD-MCNC: 13.6 G/DL (ref 11.5–16)
IMM GRANULOCYTES # BLD AUTO: 0.02 K/UL (ref 0–0.04)
IMM GRANULOCYTES NFR BLD AUTO: 0.3 % (ref 0–0.5)
LYMPHOCYTES # BLD: 2.22 K/UL (ref 0.8–3.5)
LYMPHOCYTES NFR BLD: 37 % (ref 12–49)
MCH RBC QN AUTO: 30.6 PG (ref 26–34)
MCHC RBC AUTO-ENTMCNC: 32.8 G/DL (ref 30–36.5)
MCV RBC AUTO: 93.5 FL (ref 80–99)
MONOCYTES # BLD: 0.38 K/UL (ref 0–1)
MONOCYTES NFR BLD: 6.3 % (ref 5–13)
NEUTS SEG # BLD: 3.26 K/UL (ref 1.8–8)
NEUTS SEG NFR BLD: 54.4 % (ref 32–75)
NRBC # BLD: 0 K/UL (ref 0–0.01)
NRBC BLD-RTO: 0 PER 100 WBC
PLATELET # BLD AUTO: 256 K/UL (ref 150–400)
PMV BLD AUTO: 9.4 FL (ref 8.9–12.9)
POTASSIUM SERPL-SCNC: 3.9 MMOL/L (ref 3.5–5.1)
PROT SERPL-MCNC: 7.2 G/DL (ref 6.4–8.2)
RBC # BLD AUTO: 4.44 M/UL (ref 3.8–5.2)
SODIUM SERPL-SCNC: 141 MMOL/L (ref 136–145)
TROPONIN I SERPL HS-MCNC: <4 NG/L (ref 0–51)
WBC # BLD AUTO: 6 K/UL (ref 3.6–11)

## 2025-02-12 PROCEDURE — 80053 COMPREHEN METABOLIC PANEL: CPT

## 2025-02-12 PROCEDURE — 85025 COMPLETE CBC W/AUTO DIFF WBC: CPT

## 2025-02-12 PROCEDURE — 36415 COLL VENOUS BLD VENIPUNCTURE: CPT

## 2025-02-12 PROCEDURE — 84484 ASSAY OF TROPONIN QUANT: CPT

## 2025-02-12 PROCEDURE — 71046 X-RAY EXAM CHEST 2 VIEWS: CPT

## 2025-02-12 PROCEDURE — 93005 ELECTROCARDIOGRAM TRACING: CPT | Performed by: STUDENT IN AN ORGANIZED HEALTH CARE EDUCATION/TRAINING PROGRAM

## 2025-02-12 PROCEDURE — 99285 EMERGENCY DEPT VISIT HI MDM: CPT

## 2025-02-12 ASSESSMENT — PAIN DESCRIPTION - LOCATION: LOCATION: CHEST

## 2025-02-12 ASSESSMENT — HEART SCORE: ECG: NORMAL

## 2025-02-12 ASSESSMENT — PAIN SCALES - GENERAL: PAINLEVEL_OUTOF10: 3

## 2025-02-12 NOTE — DISCHARGE INSTRUCTIONS
1.02 mg/dL    BUN/Creatinine Ratio 12 12 - 20      EstPreet Filt Rate 69 >60 ml/min/1.73m2    Calcium 9.4 8.5 - 10.1 mg/dL    Total Bilirubin 0.4 0.2 - 1.0 mg/dL    AST 29 15 - 37 U/L    ALT 33 12 - 78 U/L    Alk Phosphatase 69 45 - 117 U/L    Total Protein 7.2 6.4 - 8.2 g/dL    Albumin 3.6 3.5 - 5.0 g/dL    Globulin 3.6 2.0 - 4.0 g/dL    Albumin/Globulin Ratio 1.0 (L) 1.1 - 2.2     Troponin One time    Collection Time: 02/12/25  4:54 PM   Result Value Ref Range    Troponin, High Sensitivity <4 0 - 51 ng/L       Radiologic Studies -   XR CHEST (2 VW)   Final Result   No acute cardiopulmonary disease. No interval change.         Electronically signed by Manuel Oneill MD        [unfilled]  [unfilled]       If you feel that you have not received excellent quality care or timely care, please ask to speak to the nurse manager. Please choose us in the future for your continued health care needs.   ------------------------------------------------------------------------------------------------------------  The exam and treatment you received in the Emergency Department were for an urgent problem and are not intended as complete care. It is very important that you follow-up with a doctor, nurse practitioner, or physician assistant in a timely manner to:  (1) confirm your diagnosis and review all imaging and lab results,  (2) re-evaluation of changes in your illness and treatment, and  (3) for ongoing care.  If your symptoms become worse or you do not improve as expected and you are unable to reach your usual health care provider, you should return to the Emergency Department. We are available 24 hours a day.     Please take your discharge instructions with you when you go to your follow-up appointment.     If a prescription has been provided, please have it filled as soon as possible to prevent a delay in treatment. Read the entire medication instruction sheet provided to you by the pharmacy. If you have any questions or

## 2025-02-12 NOTE — ED PROVIDER NOTES
EMERGENCY DEPARTMENT HISTORY AND PHYSICAL EXAM      Date: 2/12/2025  Patient Name: Bg Zayas  MRN: [id number]  YOB: 1970  Date of evaluation: 2/12/2025  Provider: Julian Thrasher MD     History of Present Illness     Chief Complaint   Patient presents with    Chest Pain       History Provided By: Patient    HPI: Bg Zayas, 55 y.o. female with past medical history as listed and reviewed below presenting to the ED for evaluation of intermittent chest pain.  Patient notes it feels like a pressure or gas buildup in the chest, lasts about 20 to 30 seconds and then is relieved, she notes it happens sometimes when she feels like she has to go and pass a bowel movement, she notes occasionally she will feel little lightheaded, she notes this has been happening for multiple months, last happened about 2 weeks ago, she is currently following with a cardiologist who is planning to do an echocardiogram on her.  She denies any history of prior heart attacks, denies any history of strokes, denies any numbness or tingling, no prior history of blood clots.  She denies any recent fevers or chills or cough.  She denies any other symptoms.    Medical History     Past Medical History:  Past Medical History:   Diagnosis Date    Anxiety     Heart murmur     Hyperlipidemia        Past Surgical History:  Past Surgical History:   Procedure Laterality Date    APPENDECTOMY      COLONOSCOPY N/A 12/5/2024    COLONOSCOPY/BIOPSY/POLYPECTOMY performed by Arash Garcia MD at [facility] ENDOSCOPY    TUBAL LIGATION         Family History:  Family History   Problem Relation Age of Onset    Breast Cancer Maternal Aunt     Lung Cancer Maternal Uncle        Social History:  Social History     Tobacco Use    Smoking status: Never    Smokeless tobacco: Never   Substance Use Topics    Alcohol use: Yes    Drug use: Not Currently       Allergies:  Allergies   Allergen Reactions    Morphine Other (See Comments)     Increases pain sensitivity

## 2025-02-14 LAB
EKG ATRIAL RATE: 71 BPM
EKG DIAGNOSIS: NORMAL
EKG P AXIS: 59 DEGREES
EKG P-R INTERVAL: 170 MS
EKG Q-T INTERVAL: 376 MS
EKG QRS DURATION: 82 MS
EKG QTC CALCULATION (BAZETT): 408 MS
EKG R AXIS: 54 DEGREES
EKG T AXIS: 56 DEGREES
EKG VENTRICULAR RATE: 71 BPM

## (undated) DEVICE — MASK ANES INF SZ 2 PREM TAIL VLV INFL PRT UNSCENTED SGL PT

## (undated) DEVICE — MASK O2 MED AD 7 FT 3 IN 1 W/ STD CONN LTX